# Patient Record
Sex: MALE | Race: WHITE | NOT HISPANIC OR LATINO | Employment: FULL TIME | URBAN - METROPOLITAN AREA
[De-identification: names, ages, dates, MRNs, and addresses within clinical notes are randomized per-mention and may not be internally consistent; named-entity substitution may affect disease eponyms.]

---

## 2023-04-19 LAB
ALANINE AMINOTRANSFERASE (SGPT) (U/L) IN SER/PLAS: 33 U/L (ref 10–52)
ALBUMIN (G/DL) IN SER/PLAS: 4.4 G/DL (ref 3.4–5)
ALKALINE PHOSPHATASE (U/L) IN SER/PLAS: 20 U/L (ref 33–120)
ANION GAP IN SER/PLAS: 12 MMOL/L (ref 10–20)
ASPARTATE AMINOTRANSFERASE (SGOT) (U/L) IN SER/PLAS: 19 U/L (ref 9–39)
BILIRUBIN TOTAL (MG/DL) IN SER/PLAS: 0.4 MG/DL (ref 0–1.2)
CALCIUM (MG/DL) IN SER/PLAS: 9.9 MG/DL (ref 8.6–10.3)
CARBON DIOXIDE, TOTAL (MMOL/L) IN SER/PLAS: 27 MMOL/L (ref 21–32)
CHLORIDE (MMOL/L) IN SER/PLAS: 103 MMOL/L (ref 98–107)
CHOLESTEROL (MG/DL) IN SER/PLAS: 248 MG/DL (ref 0–199)
CHOLESTEROL IN HDL (MG/DL) IN SER/PLAS: 61.1 MG/DL
CHOLESTEROL/HDL RATIO: 4.1
CREATININE (MG/DL) IN SER/PLAS: 0.9 MG/DL (ref 0.5–1.3)
ESTIMATED AVERAGE GLUCOSE FOR HBA1C: 123 MG/DL
GFR MALE: >90 ML/MIN/1.73M2
GLUCOSE (MG/DL) IN SER/PLAS: 105 MG/DL (ref 74–99)
HEMOGLOBIN A1C/HEMOGLOBIN TOTAL IN BLOOD: 5.9 %
LDL: 158 MG/DL (ref 0–99)
POTASSIUM (MMOL/L) IN SER/PLAS: 4.3 MMOL/L (ref 3.5–5.3)
PROTEIN TOTAL: 6.9 G/DL (ref 6.4–8.2)
SODIUM (MMOL/L) IN SER/PLAS: 138 MMOL/L (ref 136–145)
THYROTROPIN (MIU/L) IN SER/PLAS BY DETECTION LIMIT <= 0.05 MIU/L: 1.56 MIU/L (ref 0.44–3.98)
TRIGLYCERIDE (MG/DL) IN SER/PLAS: 147 MG/DL (ref 0–149)
UREA NITROGEN (MG/DL) IN SER/PLAS: 17 MG/DL (ref 6–23)
VLDL: 29 MG/DL (ref 0–40)

## 2023-04-24 PROBLEM — E88.810 METABOLIC SYNDROME X: Status: ACTIVE | Noted: 2023-04-24

## 2023-04-24 PROBLEM — F17.210 SMOKING GREATER THAN 25 PACK YEARS: Status: ACTIVE | Noted: 2023-04-24

## 2023-04-24 PROBLEM — E78.5 HYPERLIPIDEMIA: Status: ACTIVE | Noted: 2023-04-24

## 2023-04-24 PROBLEM — F17.200 NEEDS SMOKING CESSATION EDUCATION: Status: ACTIVE | Noted: 2023-04-24

## 2023-04-24 PROBLEM — F17.200 CURRENT SMOKER: Status: ACTIVE | Noted: 2023-04-24

## 2023-04-24 PROBLEM — E04.0 GOITER DIFFUSE, NONTOXIC: Status: ACTIVE | Noted: 2023-04-24

## 2023-04-24 PROBLEM — F10.20 ALCOHOL DEPENDENCE, EPISODIC (MULTI): Status: ACTIVE | Noted: 2023-04-24

## 2023-04-24 RX ORDER — ALBUTEROL SULFATE 90 UG/1
2 AEROSOL, METERED RESPIRATORY (INHALATION) EVERY 4 HOURS PRN
COMMUNITY
Start: 2021-11-10 | End: 2023-04-25 | Stop reason: ALTCHOICE

## 2023-04-25 ENCOUNTER — OFFICE VISIT (OUTPATIENT)
Dept: PRIMARY CARE | Facility: CLINIC | Age: 48
End: 2023-04-25
Payer: COMMERCIAL

## 2023-04-25 VITALS
WEIGHT: 224 LBS | RESPIRATION RATE: 16 BRPM | SYSTOLIC BLOOD PRESSURE: 122 MMHG | DIASTOLIC BLOOD PRESSURE: 80 MMHG | HEART RATE: 78 BPM | HEIGHT: 74 IN | BODY MASS INDEX: 28.75 KG/M2

## 2023-04-25 DIAGNOSIS — E78.2 MIXED HYPERLIPIDEMIA: ICD-10-CM

## 2023-04-25 DIAGNOSIS — F10.20 ALCOHOL DEPENDENCE, EPISODIC (MULTI): Primary | ICD-10-CM

## 2023-04-25 DIAGNOSIS — Z00.00 ENCOUNTER FOR PREVENTIVE HEALTH EXAMINATION: ICD-10-CM

## 2023-04-25 DIAGNOSIS — F17.210 SMOKING GREATER THAN 25 PACK YEARS: ICD-10-CM

## 2023-04-25 DIAGNOSIS — E66.3 OVERWEIGHT WITH BODY MASS INDEX (BMI) OF 28 TO 28.9 IN ADULT: ICD-10-CM

## 2023-04-25 DIAGNOSIS — F17.200 NEEDS SMOKING CESSATION EDUCATION: ICD-10-CM

## 2023-04-25 DIAGNOSIS — F17.200 CURRENT SMOKER: ICD-10-CM

## 2023-04-25 DIAGNOSIS — E88.810 METABOLIC SYNDROME X: ICD-10-CM

## 2023-04-25 PROCEDURE — 90471 IMMUNIZATION ADMIN: CPT | Performed by: INTERNAL MEDICINE

## 2023-04-25 PROCEDURE — 99214 OFFICE O/P EST MOD 30 MIN: CPT | Performed by: INTERNAL MEDICINE

## 2023-04-25 PROCEDURE — G0446 INTENS BEHAVE THER CARDIO DX: HCPCS | Performed by: INTERNAL MEDICINE

## 2023-04-25 PROCEDURE — 99406 BEHAV CHNG SMOKING 3-10 MIN: CPT | Performed by: INTERNAL MEDICINE

## 2023-04-25 PROCEDURE — G0442 ANNUAL ALCOHOL SCREEN 15 MIN: HCPCS | Performed by: INTERNAL MEDICINE

## 2023-04-25 PROCEDURE — 3008F BODY MASS INDEX DOCD: CPT | Performed by: INTERNAL MEDICINE

## 2023-04-25 PROCEDURE — 90750 HZV VACC RECOMBINANT IM: CPT | Performed by: INTERNAL MEDICINE

## 2023-04-25 PROCEDURE — 4004F PT TOBACCO SCREEN RCVD TLK: CPT | Performed by: INTERNAL MEDICINE

## 2023-04-25 ASSESSMENT — LIFESTYLE VARIABLES
AUDIT-C TOTAL SCORE: 10
HOW OFTEN DO YOU HAVE A DRINK CONTAINING ALCOHOL: 4 OR MORE TIMES A WEEK
HOW MANY STANDARD DRINKS CONTAINING ALCOHOL DO YOU HAVE ON A TYPICAL DAY: 5 OR 6
HAS A RELATIVE, FRIEND, DOCTOR, OR ANOTHER HEALTH PROFESSIONAL EXPRESSED CONCERN ABOUT YOUR DRINKING OR SUGGESTED YOU CUT DOWN: NO
HAVE YOU OR SOMEONE ELSE BEEN INJURED AS A RESULT OF YOUR DRINKING: NO
HOW OFTEN DURING THE LAST YEAR HAVE YOU FAILED TO DO WHAT WAS NORMALLY EXPECTED FROM YOU BECAUSE OF DRINKING: NEVER
SKIP TO QUESTIONS 9-10: 0
HOW OFTEN DURING THE LAST YEAR HAVE YOU NEEDED AN ALCOHOLIC DRINK FIRST THING IN THE MORNING TO GET YOURSELF GOING AFTER A NIGHT OF HEAVY DRINKING: NEVER
HOW OFTEN DURING THE LAST YEAR HAVE YOU HAD A FEELING OF GUILT OR REMORSE AFTER DRINKING: LESS THAN MONTHLY
HOW OFTEN DO YOU HAVE SIX OR MORE DRINKS ON ONE OCCASION: DAILY OR ALMOST DAILY
AUDIT TOTAL SCORE: -1

## 2023-04-25 ASSESSMENT — ANXIETY QUESTIONNAIRES
2. NOT BEING ABLE TO STOP OR CONTROL WORRYING: NOT AT ALL
6. BECOMING EASILY ANNOYED OR IRRITABLE: NOT AT ALL
3. WORRYING TOO MUCH ABOUT DIFFERENT THINGS: NOT AT ALL
4. TROUBLE RELAXING: NOT AT ALL
1. FEELING NERVOUS, ANXIOUS, OR ON EDGE: NOT AT ALL
IF YOU CHECKED OFF ANY PROBLEMS ON THIS QUESTIONNAIRE, HOW DIFFICULT HAVE THESE PROBLEMS MADE IT FOR YOU TO DO YOUR WORK, TAKE CARE OF THINGS AT HOME, OR GET ALONG WITH OTHER PEOPLE: NOT DIFFICULT AT ALL
GAD7 TOTAL SCORE: 0
5. BEING SO RESTLESS THAT IT IS HARD TO SIT STILL: NOT AT ALL
7. FEELING AFRAID AS IF SOMETHING AWFUL MIGHT HAPPEN: NOT AT ALL

## 2023-04-25 ASSESSMENT — COLUMBIA-SUICIDE SEVERITY RATING SCALE - C-SSRS
1. IN THE PAST MONTH, HAVE YOU WISHED YOU WERE DEAD OR WISHED YOU COULD GO TO SLEEP AND NOT WAKE UP?: NO
6. HAVE YOU EVER DONE ANYTHING, STARTED TO DO ANYTHING, OR PREPARED TO DO ANYTHING TO END YOUR LIFE?: NO
2. HAVE YOU ACTUALLY HAD ANY THOUGHTS OF KILLING YOURSELF?: NO

## 2023-04-25 ASSESSMENT — ENCOUNTER SYMPTOMS
DEPRESSION: 0
OCCASIONAL FEELINGS OF UNSTEADINESS: 0
LOSS OF SENSATION IN FEET: 0

## 2023-04-25 ASSESSMENT — PATIENT HEALTH QUESTIONNAIRE - PHQ9
1. LITTLE INTEREST OR PLEASURE IN DOING THINGS: NOT AT ALL
SUM OF ALL RESPONSES TO PHQ9 QUESTIONS 1 AND 2: 0
2. FEELING DOWN, DEPRESSED OR HOPELESS: NOT AT ALL

## 2023-04-25 NOTE — ASSESSMENT & PLAN NOTE
Working on quitting smoking ready to set a quit date less than half pack of cigarettes a day continue encouragement

## 2023-04-25 NOTE — ASSESSMENT & PLAN NOTE
Patient will have CT calcium artery scoring through CT will give us insight on whether patient has any history of lung nodules as well

## 2023-04-25 NOTE — ASSESSMENT & PLAN NOTE
Alcohol intake is 6 beers a day encouraged to cut down alcohol intake to 1-2 beers or less than 14 beers a week

## 2023-04-25 NOTE — ASSESSMENT & PLAN NOTE
Update shingles vaccination today.  Colon cancer screening completed by fecal immunoassay negative continue annual screening consider colonoscopy diet exercise measures given smoking cessation and alcohol reduction discussed reevaluate in 6 months

## 2023-04-25 NOTE — PROGRESS NOTES
"Subjective   Reason for Visit: Nato Lopez is an 48 y.o. male here for a  annual visit.     Past Medical, Surgical, and Family History reviewed and updated in chart.    Reviewed all medications by prescribing practitioner or clinical pharmacist (such as prescriptions, OTCs, herbal therapies and supplements) and documented in the medical record.    HPI    Patient Care Team:  Mario Stoll DO as PCP - General  Mario Stoll DO as PCP - [place] [facility] PCP     Review of Systems   All other systems reviewed and are negative.      Objective   Vitals:  /80 (BP Location: Left arm, Patient Position: Sitting)   Pulse 78   Resp 16   Ht 1.88 m (6' 2\")   Wt 102 kg (224 lb)   BMI 28.76 kg/m²       Physical Exam  Vitals and nursing note reviewed.   Constitutional:       General: He is not in acute distress.     Appearance: Normal appearance. He is well-developed. He is not toxic-appearing.   HENT:      Head: Normocephalic and atraumatic.      Right Ear: Tympanic membrane and external ear normal.      Left Ear: Tympanic membrane and external ear normal.      Nose: Nose normal.      Mouth/Throat:      Mouth: Mucous membranes are moist.      Pharynx: Oropharynx is clear. No oropharyngeal exudate or posterior oropharyngeal erythema.      Tonsils: No tonsillar exudate. 2+ on the right. 2+ on the left.   Eyes:      Extraocular Movements: Extraocular movements intact.      Conjunctiva/sclera: Conjunctivae normal.   Cardiovascular:      Rate and Rhythm: Normal rate and regular rhythm.      Pulses: Normal pulses.      Heart sounds: Normal heart sounds. No murmur heard.  Pulmonary:      Effort: Pulmonary effort is normal.      Breath sounds: Normal breath sounds.   Abdominal:      General: Abdomen is flat. Bowel sounds are normal.      Palpations: Abdomen is soft.   Musculoskeletal:      Cervical back: Neck supple.   Feet:      Right foot:      Skin integrity: Skin integrity normal. No ulcer, blister, skin breakdown, " erythema, warmth or callus.      Toenail Condition: Right toenails are normal.      Left foot:      Skin integrity: Skin integrity normal. No ulcer, blister, skin breakdown, erythema, warmth or callus.      Toenail Condition: Left toenails are normal.   Lymphadenopathy:      Cervical: No cervical adenopathy.   Skin:     General: Skin is warm and dry.      Capillary Refill: Capillary refill takes more than 3 seconds.      Findings: No rash.   Neurological:      Mental Status: He is alert. Mental status is at baseline.      Sensory: Sensation is intact.   Psychiatric:         Mood and Affect: Mood normal.         Behavior: Behavior normal.         Thought Content: Thought content normal.         Judgment: Judgment normal.         Assessment/Plan   Problem List Items Addressed This Visit          Endocrine/Metabolic    Overweight with body mass index (BMI) of 28 to 28.9 in adult    Overview     Patient committed to reducing caloric intake through diet and exercise hikes and likes to fish continue to promote active lifestyle            Other    Alcohol dependence, episodic (CMS/HCC) - Primary    Current Assessment & Plan     Alcohol intake is 6 beers a day encouraged to cut down alcohol intake to 1-2 beers or less than 14 beers a week         Hyperlipidemia    Current Assessment & Plan     Promotion of low-cholesterol diet Mediterranean diet with focus on reducing processed foods increasing more fish and less beef in diet particularly in the setting of mild impaired fasting sugars with mildly elevated fasting blood sugar and hemoglobin A1c of 5.9         Metabolic syndrome X    Current Assessment & Plan     Evaluate further risk with CT calcium score         Needs smoking cessation education    Current Assessment & Plan     Education given on smoking cessation         Current smoker    Current Assessment & Plan     Working on quitting smoking ready to set a quit date less than half pack of cigarettes a day continue  encouragement         Smoking greater than 25 pack years    Current Assessment & Plan     Patient will have CT calcium artery scoring through CT will give us insight on whether patient has any history of lung nodules as well         Encounter for preventive health examination    Current Assessment & Plan     Update shingles vaccination today.  Colon cancer screening completed by fecal immunoassay negative continue annual screening consider colonoscopy diet exercise measures given smoking cessation and alcohol reduction discussed reevaluate in 6 months

## 2023-04-25 NOTE — ASSESSMENT & PLAN NOTE
Promotion of low-cholesterol diet Mediterranean diet with focus on reducing processed foods increasing more fish and less beef in diet particularly in the setting of mild impaired fasting sugars with mildly elevated fasting blood sugar and hemoglobin A1c of 5.9

## 2023-04-25 NOTE — PROGRESS NOTES
"Subjective   Patient ID: Nato Lopez is a 48 y.o. male who presents for Follow-up and Hypothyroidism.    HPI     Review of Systems    Objective   /80 (BP Location: Left arm, Patient Position: Sitting)   Pulse 78   Resp 16   Ht 1.88 m (6' 2\")   Wt 102 kg (224 lb)   BMI 28.76 kg/m²     Physical Exam    Assessment/Plan          "

## 2023-10-18 PROCEDURE — 80053 COMPREHEN METABOLIC PANEL: CPT

## 2023-10-18 PROCEDURE — 82043 UR ALBUMIN QUANTITATIVE: CPT

## 2023-10-18 PROCEDURE — 80061 LIPID PANEL: CPT

## 2023-10-18 PROCEDURE — 86803 HEPATITIS C AB TEST: CPT

## 2023-10-18 PROCEDURE — 83036 HEMOGLOBIN GLYCOSYLATED A1C: CPT

## 2023-10-18 PROCEDURE — 82570 ASSAY OF URINE CREATININE: CPT

## 2023-10-20 DIAGNOSIS — Z23 FLU VACCINE NEED: ICD-10-CM

## 2023-10-25 ENCOUNTER — OFFICE VISIT (OUTPATIENT)
Dept: PRIMARY CARE | Facility: CLINIC | Age: 48
End: 2023-10-25
Payer: COMMERCIAL

## 2023-10-25 VITALS
DIASTOLIC BLOOD PRESSURE: 80 MMHG | HEIGHT: 74 IN | HEART RATE: 68 BPM | WEIGHT: 221 LBS | BODY MASS INDEX: 28.36 KG/M2 | SYSTOLIC BLOOD PRESSURE: 122 MMHG

## 2023-10-25 DIAGNOSIS — E78.2 MIXED HYPERLIPIDEMIA: ICD-10-CM

## 2023-10-25 DIAGNOSIS — F10.20 ALCOHOL DEPENDENCE, EPISODIC (MULTI): ICD-10-CM

## 2023-10-25 DIAGNOSIS — F17.200 CURRENT SMOKER: ICD-10-CM

## 2023-10-25 DIAGNOSIS — E88.810 METABOLIC SYNDROME X: ICD-10-CM

## 2023-10-25 DIAGNOSIS — F17.210 SMOKING GREATER THAN 25 PACK YEARS: ICD-10-CM

## 2023-10-25 DIAGNOSIS — Z23 NEED FOR SHINGLES VACCINE: ICD-10-CM

## 2023-10-25 DIAGNOSIS — Z12.11 COLON CANCER SCREENING: Primary | ICD-10-CM

## 2023-10-25 DIAGNOSIS — F17.200 NEEDS SMOKING CESSATION EDUCATION: ICD-10-CM

## 2023-10-25 DIAGNOSIS — E66.3 OVERWEIGHT WITH BODY MASS INDEX (BMI) OF 28 TO 28.9 IN ADULT: ICD-10-CM

## 2023-10-25 DIAGNOSIS — Z00.00 ENCOUNTER FOR PREVENTIVE HEALTH EXAMINATION: ICD-10-CM

## 2023-10-25 PROCEDURE — 90471 IMMUNIZATION ADMIN: CPT | Performed by: INTERNAL MEDICINE

## 2023-10-25 PROCEDURE — 90750 HZV VACC RECOMBINANT IM: CPT | Performed by: INTERNAL MEDICINE

## 2023-10-25 PROCEDURE — 4004F PT TOBACCO SCREEN RCVD TLK: CPT | Performed by: INTERNAL MEDICINE

## 2023-10-25 PROCEDURE — 3008F BODY MASS INDEX DOCD: CPT | Performed by: INTERNAL MEDICINE

## 2023-10-25 PROCEDURE — 99214 OFFICE O/P EST MOD 30 MIN: CPT | Performed by: INTERNAL MEDICINE

## 2023-10-25 RX ORDER — CLOBETASOL PROPIONATE 0.5 MG/G
OINTMENT TOPICAL 2 TIMES DAILY
COMMUNITY
Start: 2023-09-11

## 2023-10-25 RX ORDER — ROSUVASTATIN CALCIUM 10 MG/1
10 TABLET, COATED ORAL DAILY
Qty: 90 TABLET | Refills: 3 | Status: SHIPPED | OUTPATIENT
Start: 2023-10-25 | End: 2024-10-24

## 2023-10-25 RX ORDER — MUPIROCIN 20 MG/G
OINTMENT TOPICAL
COMMUNITY
Start: 2023-09-11

## 2023-10-25 NOTE — PROGRESS NOTES
"Subjective   Reason for Visit: Nato Lopez is an 48 y.o. male here for a Medicare Wellness visit.          Reviewed all medications by prescribing practitioner or clinical pharmacist (such as prescriptions, OTCs, herbal therapies and supplements) and documented in the medical record.    HPI    Patient Care Team:  Mario Stoll DO as PCP - General  Mario Stoll DO as PCP - [facility]MA PCP     Review of Systems    Objective   Vitals:  /80 (BP Location: Left arm, Patient Position: Sitting)   Pulse 68   Ht 1.88 m (6' 2\")   Wt 100 kg (221 lb)   BMI 28.37 kg/m²       Physical Exam    Assessment/Plan   Problem List Items Addressed This Visit       Alcohol dependence, episodic (CMS/[facility])    Hyperlipidemia    Metabolic syndrome X    Needs smoking cessation education    Current smoker    Smoking greater than 25 pack years    Overweight with body mass index (BMI) of 28 to 28.9 in adult    Overview     Patient committed to reducing caloric intake through diet and exercise hikes and likes to fish continue to promote active lifestyle         Encounter for preventive health examination     Other Visit Diagnoses       Need for shingles vaccine        Relevant Orders    Zoster vaccine, recombinant, adult (SHINGRIX) (Completed)               "

## 2023-10-25 NOTE — PROGRESS NOTES
"Subjective   Patient ID: Nato Lopez is a 48 y.o. male who presents for Follow-up.    HPI     Review of Systems    Objective   /80 (BP Location: Left arm, Patient Position: Sitting)   Pulse 68   Ht 1.88 m (6' 2\")   Wt 100 kg (221 lb)   BMI 28.37 kg/m²     Physical Exam    Assessment/Plan          "

## 2024-02-01 ENCOUNTER — HOSPITAL ENCOUNTER (OUTPATIENT)
Dept: GASTROENTEROLOGY | Facility: HOSPITAL | Age: 49
Discharge: HOME | End: 2024-02-01
Payer: COMMERCIAL

## 2024-02-01 ENCOUNTER — ANESTHESIA (OUTPATIENT)
Dept: GASTROENTEROLOGY | Facility: HOSPITAL | Age: 49
End: 2024-02-01
Payer: COMMERCIAL

## 2024-02-01 ENCOUNTER — ANESTHESIA EVENT (OUTPATIENT)
Dept: GASTROENTEROLOGY | Facility: HOSPITAL | Age: 49
End: 2024-02-01
Payer: COMMERCIAL

## 2024-02-01 VITALS
HEART RATE: 76 BPM | OXYGEN SATURATION: 99 % | RESPIRATION RATE: 14 BRPM | BODY MASS INDEX: 28.36 KG/M2 | DIASTOLIC BLOOD PRESSURE: 92 MMHG | TEMPERATURE: 98 F | SYSTOLIC BLOOD PRESSURE: 167 MMHG | HEIGHT: 74 IN | WEIGHT: 221 LBS

## 2024-02-01 DIAGNOSIS — Z12.11 SCREEN FOR COLON CANCER: Primary | ICD-10-CM

## 2024-02-01 PROCEDURE — 2500000004 HC RX 250 GENERAL PHARMACY W/ HCPCS (ALT 636 FOR OP/ED): Performed by: NURSE ANESTHETIST, CERTIFIED REGISTERED

## 2024-02-01 PROCEDURE — 2500000005 HC RX 250 GENERAL PHARMACY W/O HCPCS: Performed by: NURSE ANESTHETIST, CERTIFIED REGISTERED

## 2024-02-01 PROCEDURE — 7100000009 HC PHASE TWO TIME - INITIAL BASE CHARGE

## 2024-02-01 PROCEDURE — 3700000001 HC GENERAL ANESTHESIA TIME - INITIAL BASE CHARGE

## 2024-02-01 PROCEDURE — 0753T DGTZ GLS MCRSCP SLD LEVEL IV: CPT | Mod: TC,SUR,PORLAB | Performed by: SURGERY

## 2024-02-01 PROCEDURE — 88305 TISSUE EXAM BY PATHOLOGIST: CPT | Performed by: PATHOLOGY

## 2024-02-01 PROCEDURE — 45380 COLONOSCOPY AND BIOPSY: CPT | Performed by: SURGERY

## 2024-02-01 PROCEDURE — 3700000002 HC GENERAL ANESTHESIA TIME - EACH INCREMENTAL 1 MINUTE

## 2024-02-01 PROCEDURE — 7100000010 HC PHASE TWO TIME - EACH INCREMENTAL 1 MINUTE

## 2024-02-01 RX ORDER — LIDOCAINE HYDROCHLORIDE 20 MG/ML
INJECTION, SOLUTION INFILTRATION; PERINEURAL AS NEEDED
Status: DISCONTINUED | OUTPATIENT
Start: 2024-02-01 | End: 2024-02-01

## 2024-02-01 RX ORDER — SODIUM CHLORIDE 9 MG/ML
20 INJECTION, SOLUTION INTRAVENOUS CONTINUOUS
Status: CANCELLED | OUTPATIENT
Start: 2024-02-01

## 2024-02-01 RX ORDER — SODIUM CHLORIDE 9 MG/ML
INJECTION, SOLUTION INTRAVENOUS CONTINUOUS PRN
Status: DISCONTINUED | OUTPATIENT
Start: 2024-02-01 | End: 2024-02-01

## 2024-02-01 RX ORDER — PROPOFOL 10 MG/ML
INJECTION, EMULSION INTRAVENOUS AS NEEDED
Status: DISCONTINUED | OUTPATIENT
Start: 2024-02-01 | End: 2024-02-01

## 2024-02-01 RX ADMIN — PROPOFOL 30 MG: 10 INJECTION, EMULSION INTRAVENOUS at 08:30

## 2024-02-01 RX ADMIN — LIDOCAINE HYDROCHLORIDE 50 MG: 20 INJECTION, SOLUTION INFILTRATION; PERINEURAL at 08:24

## 2024-02-01 RX ADMIN — PROPOFOL 30 MG: 10 INJECTION, EMULSION INTRAVENOUS at 08:42

## 2024-02-01 RX ADMIN — PROPOFOL 20 MG: 10 INJECTION, EMULSION INTRAVENOUS at 08:36

## 2024-02-01 RX ADMIN — PROPOFOL 20 MG: 10 INJECTION, EMULSION INTRAVENOUS at 08:34

## 2024-02-01 RX ADMIN — PROPOFOL 30 MG: 10 INJECTION, EMULSION INTRAVENOUS at 08:27

## 2024-02-01 RX ADMIN — PROPOFOL 30 MG: 10 INJECTION, EMULSION INTRAVENOUS at 08:38

## 2024-02-01 RX ADMIN — PROPOFOL 100 MG: 10 INJECTION, EMULSION INTRAVENOUS at 08:24

## 2024-02-01 RX ADMIN — PROPOFOL 20 MG: 10 INJECTION, EMULSION INTRAVENOUS at 08:43

## 2024-02-01 RX ADMIN — PROPOFOL 30 MG: 10 INJECTION, EMULSION INTRAVENOUS at 08:35

## 2024-02-01 RX ADMIN — PROPOFOL 20 MG: 10 INJECTION, EMULSION INTRAVENOUS at 08:40

## 2024-02-01 RX ADMIN — SODIUM CHLORIDE: 9 INJECTION, SOLUTION INTRAVENOUS at 08:20

## 2024-02-01 RX ADMIN — PROPOFOL 20 MG: 10 INJECTION, EMULSION INTRAVENOUS at 08:28

## 2024-02-01 RX ADMIN — PROPOFOL 50 MG: 10 INJECTION, EMULSION INTRAVENOUS at 08:25

## 2024-02-01 RX ADMIN — PROPOFOL 20 MG: 10 INJECTION, EMULSION INTRAVENOUS at 08:32

## 2024-02-01 SDOH — HEALTH STABILITY: MENTAL HEALTH: CURRENT SMOKER: 1

## 2024-02-01 ASSESSMENT — PAIN - FUNCTIONAL ASSESSMENT
PAIN_FUNCTIONAL_ASSESSMENT: 0-10

## 2024-02-01 ASSESSMENT — PAIN SCALES - GENERAL
PAINLEVEL_OUTOF10: 0 - NO PAIN
PAIN_LEVEL: 1
PAINLEVEL_OUTOF10: 0 - NO PAIN

## 2024-02-01 ASSESSMENT — COLUMBIA-SUICIDE SEVERITY RATING SCALE - C-SSRS
6. HAVE YOU EVER DONE ANYTHING, STARTED TO DO ANYTHING, OR PREPARED TO DO ANYTHING TO END YOUR LIFE?: NO
1. IN THE PAST MONTH, HAVE YOU WISHED YOU WERE DEAD OR WISHED YOU COULD GO TO SLEEP AND NOT WAKE UP?: NO
2. HAVE YOU ACTUALLY HAD ANY THOUGHTS OF KILLING YOURSELF?: NO

## 2024-02-01 NOTE — ANESTHESIA POSTPROCEDURE EVALUATION
Patient: Nato Lopez    Procedure Summary       Date: 02/01/24 Room / Location: [facility]    Anesthesia Start: 0820 Anesthesia Stop: 0850    Procedure: COLONOSCOPY Diagnosis: Screen for colon cancer    Scheduled Providers: Hugo Muñoz MD Responsible Provider: BOWEN Ventura    Anesthesia Type: MAC ASA Status: 2            Anesthesia Type: MAC    Vitals Value Taken Time   /93 02/01/24 0848   Temp 36.7 °C (98 °F) 02/01/24 0848   Pulse 71 02/01/24 0848   Resp 12 02/01/24 0848   SpO2 95 % 02/01/24 0848       Anesthesia Post Evaluation    Patient location during evaluation: PACU  Patient participation: complete - patient participated  Level of consciousness: sleepy but conscious  Pain score: 1  Pain management: adequate  Airway patency: patent  Cardiovascular status: acceptable and blood pressure returned to baseline  Respiratory status: acceptable, room air and spontaneous ventilation  Hydration status: acceptable  Postoperative Nausea and Vomiting: none        There were no known notable events for this encounter.

## 2024-02-01 NOTE — ANESTHESIA PREPROCEDURE EVALUATION
Patient: Nato Lopez    Procedure Information       Date/Time: 02/01/24 0800    Scheduled providers: Hugo Muñoz MD    Procedure: COLONOSCOPY    Location: [facility]            Relevant Problems   Anesthesia (within normal limits)      Cardiovascular   (+) Hyperlipidemia      Endocrine   (+) Goiter diffuse, nontoxic       Clinical information reviewed:   Tobacco  Allergies  Meds   Med Hx  Surg Hx   Fam Hx  Soc Hx        NPO Detail:  NPO/Void Status  Date of Last Liquid: 02/01/24  Time of Last Liquid: 0630  Date of Last Solid: 01/30/24  Time of Last Solid: 2000  Last Intake Type: Clear fluids         Physical Exam    Airway  Mallampati: III  TM distance: >3 FB  Neck ROM: full     Cardiovascular - normal exam     Dental - normal exam     Pulmonary - normal exam     Abdominal            Anesthesia Plan    History of general anesthesia?: yes  History of complications of general anesthesia?: no    ASA 2     MAC     The patient is a current smoker.  Patient was previously instructed to abstain from smoking on day of procedure.  Patient did not smoke on day of procedure.    intravenous induction   Anesthetic plan and risks discussed with patient.  Use of blood products discussed with patient who consented to blood products.    Plan discussed with CRNA.

## 2024-02-01 NOTE — H&P
History Of Present Illness  Nato Lopez is a 48 y.o. male presenting with hx polyps.     Past Medical History  He has a past medical history of Dyslipidemia, Elevated blood-pressure reading, without diagnosis of hypertension (08/11/2020), Goiter diffuse, nontoxic, Metabolic syndrome (04/15/2021), Other specified personal risk factors, not elsewhere classified (04/15/2021), Personal history of (healed) traumatic fracture, Personal history of nicotine dependence (08/10/2020), Personal history of other diseases of the respiratory system (11/10/2021), Personal history of other diseases of the respiratory system (11/10/2021), Personal history of other endocrine, nutritional and metabolic disease (08/10/2020), Personal history of other endocrine, nutritional and metabolic disease, and Personal history of other specified conditions (04/15/2021).    Surgical History  He has a past surgical history that includes Other surgical history (08/11/2020).     Social History  He reports that he has been smoking cigarettes. He has a 12.50 pack-year smoking history. He has never used smokeless tobacco. He reports current alcohol use of about 24.0 standard drinks of alcohol per week. He reports that he does not use drugs.    Family History  Family History   Problem Relation Name Age of Onset    Diabetes Mother      Alzheimer's disease Mother      Hypertension Father      Thyroid disease Father      Other (vascular disorder) Father      Thyroid disease Sister      Breast cancer Sister      Hypertension Brother          Allergies  Patient has no known allergies.    Review of Systems   All other systems reviewed and are negative.       Physical Exam  Vitals reviewed.   Cardiovascular:      Rate and Rhythm: Normal rate and regular rhythm.   Pulmonary:      Breath sounds: Normal breath sounds.   Skin:     General: Skin is warm and dry.   Neurological:      Mental Status: He is alert.   Psychiatric:         Mood and Affect: Mood normal.  "         Last Recorded Vitals  Blood pressure (!) 145/100, pulse 78, temperature 36.2 °C (97.2 °F), temperature source Temporal, resp. rate 18, height 1.88 m (6' 2\"), weight 100 kg (221 lb), SpO2 95 %.    Relevant Results               Assessment/Plan   Active Problems:  There are no active Hospital Problems.      For colo       I spent 15 minutes in the professional and overall care of this patient.      Hugo Muñoz MD  "

## 2024-02-02 NOTE — ADDENDUM NOTE
Encounter addended by: Mauri Chamorro RN on: 2/2/2024 10:22 AM   Actions taken: Contacts section saved, Flowsheet accepted

## 2024-02-06 LAB
LABORATORY COMMENT REPORT: NORMAL
PATH REPORT.FINAL DX SPEC: NORMAL
PATH REPORT.GROSS SPEC: NORMAL
PATH REPORT.RELEVANT HX SPEC: NORMAL
PATH REPORT.TOTAL CANCER: NORMAL

## 2024-04-22 ENCOUNTER — LAB (OUTPATIENT)
Dept: LAB | Facility: LAB | Age: 49
End: 2024-04-22
Payer: COMMERCIAL

## 2024-04-22 DIAGNOSIS — F10.20 ALCOHOL DEPENDENCE, EPISODIC (MULTI): ICD-10-CM

## 2024-04-22 DIAGNOSIS — E88.810 METABOLIC SYNDROME X: ICD-10-CM

## 2024-04-22 DIAGNOSIS — F17.210 SMOKING GREATER THAN 25 PACK YEARS: ICD-10-CM

## 2024-04-22 DIAGNOSIS — E78.2 MIXED HYPERLIPIDEMIA: ICD-10-CM

## 2024-04-22 DIAGNOSIS — F17.200 CURRENT SMOKER: ICD-10-CM

## 2024-04-22 DIAGNOSIS — Z12.11 COLON CANCER SCREENING: ICD-10-CM

## 2024-04-22 DIAGNOSIS — F17.200 NEEDS SMOKING CESSATION EDUCATION: ICD-10-CM

## 2024-04-22 DIAGNOSIS — E66.3 OVERWEIGHT WITH BODY MASS INDEX (BMI) OF 28 TO 28.9 IN ADULT: ICD-10-CM

## 2024-04-22 LAB
ALBUMIN SERPL BCP-MCNC: 4.8 G/DL (ref 3.4–5)
ALP SERPL-CCNC: 22 U/L (ref 33–120)
ALT SERPL W P-5'-P-CCNC: 26 U/L (ref 10–52)
ANION GAP SERPL CALC-SCNC: 13 MMOL/L (ref 10–20)
AST SERPL W P-5'-P-CCNC: 19 U/L (ref 9–39)
BILIRUB SERPL-MCNC: 0.3 MG/DL (ref 0–1.2)
BUN SERPL-MCNC: 14 MG/DL (ref 6–23)
CALCIUM SERPL-MCNC: 9.9 MG/DL (ref 8.6–10.3)
CHLORIDE SERPL-SCNC: 103 MMOL/L (ref 98–107)
CHOLEST SERPL-MCNC: 212 MG/DL (ref 0–199)
CHOLESTEROL/HDL RATIO: 2.8
CO2 SERPL-SCNC: 28 MMOL/L (ref 21–32)
CREAT SERPL-MCNC: 0.9 MG/DL (ref 0.5–1.3)
CREAT UR-MCNC: 130.1 MG/DL (ref 20–370)
EGFRCR SERPLBLD CKD-EPI 2021: >90 ML/MIN/1.73M*2
EST. AVERAGE GLUCOSE BLD GHB EST-MCNC: 134 MG/DL
GLUCOSE SERPL-MCNC: 97 MG/DL (ref 74–99)
HBA1C MFR BLD: 6.3 %
HDLC SERPL-MCNC: 75.4 MG/DL
LDLC SERPL CALC-MCNC: 87 MG/DL
MICROALBUMIN UR-MCNC: 9.1 MG/L
MICROALBUMIN/CREAT UR: 7 UG/MG CREAT
NON HDL CHOLESTEROL: 137 MG/DL (ref 0–149)
POTASSIUM SERPL-SCNC: 5.1 MMOL/L (ref 3.5–5.3)
PROT SERPL-MCNC: 7.2 G/DL (ref 6.4–8.2)
SODIUM SERPL-SCNC: 139 MMOL/L (ref 136–145)
TRIGL SERPL-MCNC: 249 MG/DL (ref 0–149)
VLDL: 50 MG/DL (ref 0–40)

## 2024-04-22 PROCEDURE — 82043 UR ALBUMIN QUANTITATIVE: CPT

## 2024-04-22 PROCEDURE — 80061 LIPID PANEL: CPT

## 2024-04-22 PROCEDURE — 36415 COLL VENOUS BLD VENIPUNCTURE: CPT

## 2024-04-22 PROCEDURE — 80053 COMPREHEN METABOLIC PANEL: CPT

## 2024-04-22 PROCEDURE — 82570 ASSAY OF URINE CREATININE: CPT

## 2024-04-22 PROCEDURE — 83036 HEMOGLOBIN GLYCOSYLATED A1C: CPT

## 2024-04-26 ENCOUNTER — OFFICE VISIT (OUTPATIENT)
Dept: PRIMARY CARE | Facility: CLINIC | Age: 49
End: 2024-04-26
Payer: COMMERCIAL

## 2024-04-26 VITALS
SYSTOLIC BLOOD PRESSURE: 134 MMHG | HEIGHT: 74 IN | WEIGHT: 213 LBS | BODY MASS INDEX: 27.34 KG/M2 | HEART RATE: 82 BPM | DIASTOLIC BLOOD PRESSURE: 80 MMHG

## 2024-04-26 DIAGNOSIS — E88.810 METABOLIC SYNDROME X: ICD-10-CM

## 2024-04-26 DIAGNOSIS — Z00.00 ENCOUNTER FOR PREVENTIVE HEALTH EXAMINATION: Primary | ICD-10-CM

## 2024-04-26 DIAGNOSIS — F17.200 NEEDS SMOKING CESSATION EDUCATION: ICD-10-CM

## 2024-04-26 DIAGNOSIS — F17.200 CURRENT SMOKER: ICD-10-CM

## 2024-04-26 DIAGNOSIS — F17.210 SMOKING GREATER THAN 25 PACK YEARS: ICD-10-CM

## 2024-04-26 DIAGNOSIS — K63.5 DYSPLASTIC POLYP OF COLON: ICD-10-CM

## 2024-04-26 DIAGNOSIS — Z12.5 PROSTATE CANCER SCREENING: ICD-10-CM

## 2024-04-26 DIAGNOSIS — F10.20 ALCOHOL DEPENDENCE, EPISODIC (MULTI): ICD-10-CM

## 2024-04-26 DIAGNOSIS — R73.02 IMPAIRED GLUCOSE TOLERANCE: ICD-10-CM

## 2024-04-26 DIAGNOSIS — E66.3 OVERWEIGHT WITH BODY MASS INDEX (BMI) OF 28 TO 28.9 IN ADULT: ICD-10-CM

## 2024-04-26 DIAGNOSIS — Z12.11 COLON CANCER SCREENING: ICD-10-CM

## 2024-04-26 DIAGNOSIS — E78.2 MIXED HYPERLIPIDEMIA: ICD-10-CM

## 2024-04-26 PROBLEM — I10 PRIMARY HYPERTENSION: Status: ACTIVE | Noted: 2024-04-26

## 2024-04-26 PROBLEM — L65.9 NONSCARRING HAIR LOSS: Status: ACTIVE | Noted: 2024-04-26

## 2024-04-26 PROCEDURE — 3079F DIAST BP 80-89 MM HG: CPT | Performed by: INTERNAL MEDICINE

## 2024-04-26 PROCEDURE — 99396 PREV VISIT EST AGE 40-64: CPT | Performed by: INTERNAL MEDICINE

## 2024-04-26 PROCEDURE — G0296 VISIT TO DETERM LDCT ELIG: HCPCS | Performed by: INTERNAL MEDICINE

## 2024-04-26 PROCEDURE — 3075F SYST BP GE 130 - 139MM HG: CPT | Performed by: INTERNAL MEDICINE

## 2024-04-26 PROCEDURE — 3008F BODY MASS INDEX DOCD: CPT | Performed by: INTERNAL MEDICINE

## 2024-04-26 PROCEDURE — G0442 ANNUAL ALCOHOL SCREEN 15 MIN: HCPCS | Performed by: INTERNAL MEDICINE

## 2024-04-26 PROCEDURE — 99406 BEHAV CHNG SMOKING 3-10 MIN: CPT | Performed by: INTERNAL MEDICINE

## 2024-04-26 RX ORDER — HYDROCHLOROTHIAZIDE 12.5 MG/1
12.5 TABLET ORAL DAILY
Qty: 90 TABLET | Refills: 3 | Status: SHIPPED | OUTPATIENT
Start: 2024-04-26 | End: 2025-04-26

## 2024-04-26 ASSESSMENT — ENCOUNTER SYMPTOMS
DEPRESSION: 0
LOSS OF SENSATION IN FEET: 0
OCCASIONAL FEELINGS OF UNSTEADINESS: 0

## 2024-04-26 ASSESSMENT — PATIENT HEALTH QUESTIONNAIRE - PHQ9
1. LITTLE INTEREST OR PLEASURE IN DOING THINGS: NOT AT ALL
2. FEELING DOWN, DEPRESSED OR HOPELESS: NOT AT ALL
SUM OF ALL RESPONSES TO PHQ9 QUESTIONS 1 AND 2: 0

## 2024-04-26 NOTE — PROGRESS NOTES
"I spent more than 3 minutes but less than 10 minutes counseling patient about need for smoking/tobacco cessation and how I can get support efforts when patient is ready to quit.  Discussed nicotine replacement therapy such as bupropion nicotine replacement therapy and Chantix .  Hypnosis,support groups,and acupuncture are other potential options.Alcohol consumption becomes hazardous when consuming women oh over 65 years old greater than 7 drinks per week or greater than 3 drinks per occasion for men greater than 14 drinks per week or greater than 4 drinks per occasion.  I spent 15 minutes screening for alcohol use.Subjective   Reason for Visit: Nato Lopez is an 49 y.o. male here for a  annual preventative health visit.     Past Medical, Surgical, and Family History reviewed and updated in chart.    Reviewed all medications by prescribing practitioner or clinical pharmacist (such as prescriptions, OTCs, herbal therapies and supplements) and documented in the medical record.    HPI    Patient Care Team:  Mario Stoll DO as PCP - General  Mario Stoll DO as PCP - Markie PALMA PCP     Review of Systems   All other systems reviewed and are negative.      Objective   Vitals:  /80 (BP Location: Right arm, Patient Position: Sitting)   Pulse 82   Ht 1.88 m (6' 2\")   Wt 96.6 kg (213 lb)   BMI 27.35 kg/m²       Physical Exam  Vitals and nursing note reviewed.   Constitutional:       General: He is not in acute distress.     Appearance: Normal appearance. He is well-developed. He is not toxic-appearing.   HENT:      Head: Normocephalic and atraumatic.      Right Ear: Tympanic membrane and external ear normal.      Left Ear: Tympanic membrane and external ear normal.      Nose: Nose normal.      Mouth/Throat:      Mouth: Mucous membranes are moist.      Pharynx: Oropharynx is clear. No oropharyngeal exudate or posterior oropharyngeal erythema.      Tonsils: No tonsillar exudate. 2+ on the right. 2+ " on the left.   Eyes:      Extraocular Movements: Extraocular movements intact.      Conjunctiva/sclera: Conjunctivae normal.   Cardiovascular:      Rate and Rhythm: Normal rate and regular rhythm.      Pulses: Normal pulses.      Heart sounds: Normal heart sounds. No murmur heard.  Pulmonary:      Effort: Pulmonary effort is normal.      Breath sounds: Normal breath sounds.   Abdominal:      General: Abdomen is flat. Bowel sounds are normal.      Palpations: Abdomen is soft.   Musculoskeletal:      Cervical back: Neck supple.   Feet:      Right foot:      Skin integrity: Skin integrity normal. No ulcer, blister, skin breakdown, erythema, warmth or callus.      Toenail Condition: Right toenails are normal.      Left foot:      Skin integrity: Skin integrity normal. No ulcer, blister, skin breakdown, erythema, warmth or callus.      Toenail Condition: Left toenails are normal.   Lymphadenopathy:      Cervical: No cervical adenopathy.   Skin:     General: Skin is warm and dry.      Capillary Refill: Capillary refill takes more than 3 seconds.      Findings: No rash.   Neurological:      Mental Status: He is alert. Mental status is at baseline.      Sensory: Sensation is intact.   Psychiatric:         Mood and Affect: Mood normal.         Behavior: Behavior normal.         Thought Content: Thought content normal.         Judgment: Judgment normal.         Assessment/Plan   Problem List Items Addressed This Visit       Alcohol dependence, episodic (Multi)    Current Assessment & Plan     Counseled patient on importance on reducing regular alcohol intake drinking about a sixpack of beer a day in the setting of impaired fasting sugars no evidence of liver dysfunction or fatty liver disease at this time continue to advise reduction and cessation and lieu of risk factors related to developing diabetes         Relevant Medications    hydroCHLOROthiazide (Microzide) 12.5 mg tablet    Other Relevant Orders    CT lung screening  low dose    Comprehensive Metabolic Panel    Hemoglobin A1C    Lipid Panel    Albumin , Urine Random    Follow Up In [facility] - PCP - Established    Referral to Tobacco Cessation Counseling    Hyperlipidemia    Current Assessment & Plan     Significant improvement in LDL cholesterol from LDL of 187 to LDL cholesterol of 87 with rosuvastatin 10 mg daily continue calcium CT score 93 mild multivessel continue aggressive management in the setting of IGT         Relevant Medications    hydroCHLOROthiazide (Microzide) 12.5 mg tablet    Other Relevant Orders    CT lung screening low dose    Comprehensive Metabolic Panel    Hemoglobin A1C    Lipid Panel    Albumin , Urine Random    Follow Up [facility] - PCP - Established    Referral to Tobacco Cessation Counseling    Metabolic syndrome X    Relevant Medications    hydroCHLOROthiazide (Microzide) 12.5 mg tablet    Other Relevant Orders    CT lung screening low dose    Comprehensive Metabolic Panel    Hemoglobin A1C    Lipid Panel    Albumin , Urine Random    Follow Up In [facility] - PCP - Established    Referral to Tobacco Cessation Counseling    Needs smoking cessation education    Relevant Medications    hydroCHLOROthiazide (Microzide) 12.5 mg tablet    Other Relevant Orders    CT lung screening low dose    Comprehensive Metabolic Panel    Hemoglobin A1C    Lipid Panel    Albumin , Urine Random    Follow Up [facility] - PCP - Established    Referral to Tobacco Cessation Counseling    Current smoker    Current Assessment & Plan     Patient trying to quit smoking smoking about half pack of cigarettes a day at this time consult smoking cessation education using different nicotine replacement therapies to reduce smoking at work asymptomatic.  Referral made for lung cancer screening         Relevant Medications    hydroCHLOROthiazide (Microzide) 12.5 mg tablet    Other Relevant Orders    CT lung screening low dose     Comprehensive Metabolic Panel    Hemoglobin A1C    Lipid Panel    Albumin , Urine Random    Follow Up In [facility] - PCP - Established    Referral to Tobacco Cessation Counseling    Smoking greater than 25 pack years    Current Assessment & Plan     Will assess lung cancer screening at this time         Overweight with body mass index (BMI) of 28 to 28.9 in adult    Overview     Patient committed to reducing caloric intake through diet and exercise hikes and likes to fish continue to promote active lifestyle         Encounter for preventive health examination - Primary    Current Assessment & Plan     Prostate cancer screening will be reassessed next evaluation up-to-date with vaccinations and screenings screening for depression okay         Impaired glucose tolerance    Current Assessment & Plan     Reevaluate with changes in diet and exercise continued improvement in weight loss hemoglobin A1c 6.3 fasting sugar of 97 reevaluate next visit         Relevant Medications    hydroCHLOROthiazide (Microzide) 12.5 mg tablet    Other Relevant Orders    CT lung screening low dose    Comprehensive Metabolic Panel    Hemoglobin A1C    Lipid Panel    Albumin , Urine Random    Follow Up In [facility] - PCP - Established    Referral to Tobacco Cessation Counseling    Dysplastic polyp of colon    Current Assessment & Plan     Patient is scheduled for 1 year evaluation regarding serrated adenoma with dysplasia removed last year          Other Visit Diagnoses       Colon cancer screening        Prostate cancer screening        Relevant Orders    PSA

## 2024-04-26 NOTE — ASSESSMENT & PLAN NOTE
Patient trying to quit smoking smoking about half pack of cigarettes a day at this time consult smoking cessation education using different nicotine replacement therapies to reduce smoking at work asymptomatic.  Referral made for lung cancer screening

## 2024-04-26 NOTE — ASSESSMENT & PLAN NOTE
Counseled patient on importance on reducing regular alcohol intake drinking about a sixpack of beer a day in the setting of impaired fasting sugars no evidence of liver dysfunction or fatty liver disease at this time continue to advise reduction and cessation and lieu of risk factors related to developing diabetes

## 2024-04-26 NOTE — ASSESSMENT & PLAN NOTE
Prostate cancer screening will be reassessed next evaluation up-to-date with vaccinations and screenings screening for depression okay

## 2024-04-26 NOTE — ASSESSMENT & PLAN NOTE
Patient is scheduled for 1 year evaluation regarding serrated adenoma with dysplasia removed last year

## 2024-04-26 NOTE — ASSESSMENT & PLAN NOTE
Significant improvement in LDL cholesterol from LDL of 187 to LDL cholesterol of 87 with rosuvastatin 10 mg daily continue calcium CT score 93 mild multivessel continue aggressive management in the setting of IGT

## 2024-04-26 NOTE — ASSESSMENT & PLAN NOTE
In addition to smoking and alcohol cessation encouraged DASH diet and start hydrochlorothiazide 12.5 mg 1 tablet daily reevaluate with next visit

## 2024-04-26 NOTE — ASSESSMENT & PLAN NOTE
Reevaluate with changes in diet and exercise continued improvement in weight loss hemoglobin A1c 6.3 fasting sugar of 97 reevaluate next visit

## 2024-05-14 ENCOUNTER — ANESTHESIA EVENT (OUTPATIENT)
Dept: GASTROENTEROLOGY | Facility: HOSPITAL | Age: 49
End: 2024-05-14
Payer: COMMERCIAL

## 2024-05-16 ENCOUNTER — ANESTHESIA (OUTPATIENT)
Dept: GASTROENTEROLOGY | Facility: HOSPITAL | Age: 49
End: 2024-05-16
Payer: COMMERCIAL

## 2024-05-16 ENCOUNTER — HOSPITAL ENCOUNTER (OUTPATIENT)
Dept: GASTROENTEROLOGY | Facility: HOSPITAL | Age: 49
Discharge: HOME | End: 2024-05-16
Payer: COMMERCIAL

## 2024-05-16 VITALS
TEMPERATURE: 97 F | DIASTOLIC BLOOD PRESSURE: 95 MMHG | SYSTOLIC BLOOD PRESSURE: 124 MMHG | RESPIRATION RATE: 20 BRPM | OXYGEN SATURATION: 95 % | HEART RATE: 89 BPM

## 2024-05-16 DIAGNOSIS — D12.6 ADENOMATOUS POLYP OF COLON, UNSPECIFIED PART OF COLON: ICD-10-CM

## 2024-05-16 PROCEDURE — 7100000010 HC PHASE TWO TIME - EACH INCREMENTAL 1 MINUTE

## 2024-05-16 PROCEDURE — 3700000001 HC GENERAL ANESTHESIA TIME - INITIAL BASE CHARGE

## 2024-05-16 PROCEDURE — 2500000005 HC RX 250 GENERAL PHARMACY W/O HCPCS: Performed by: LICENSED PRACTICAL NURSE

## 2024-05-16 PROCEDURE — 7100000009 HC PHASE TWO TIME - INITIAL BASE CHARGE

## 2024-05-16 PROCEDURE — 88305 TISSUE EXAM BY PATHOLOGIST: CPT | Mod: TC,PORLAB | Performed by: SURGERY

## 2024-05-16 PROCEDURE — 2500000004 HC RX 250 GENERAL PHARMACY W/ HCPCS (ALT 636 FOR OP/ED): Performed by: LICENSED PRACTICAL NURSE

## 2024-05-16 PROCEDURE — 45385 COLONOSCOPY W/LESION REMOVAL: CPT | Performed by: SURGERY

## 2024-05-16 PROCEDURE — 3700000002 HC GENERAL ANESTHESIA TIME - EACH INCREMENTAL 1 MINUTE

## 2024-05-16 PROCEDURE — 88305 TISSUE EXAM BY PATHOLOGIST: CPT | Performed by: PATHOLOGY

## 2024-05-16 RX ORDER — PROPOFOL 10 MG/ML
INJECTION, EMULSION INTRAVENOUS AS NEEDED
Status: DISCONTINUED | OUTPATIENT
Start: 2024-05-16 | End: 2024-05-16

## 2024-05-16 RX ORDER — LIDOCAINE HYDROCHLORIDE 20 MG/ML
INJECTION, SOLUTION INFILTRATION; PERINEURAL AS NEEDED
Status: DISCONTINUED | OUTPATIENT
Start: 2024-05-16 | End: 2024-05-16

## 2024-05-16 RX ADMIN — LIDOCAINE HYDROCHLORIDE 20 MG: 20 INJECTION, SOLUTION INFILTRATION; PERINEURAL at 12:50

## 2024-05-16 RX ADMIN — PROPOFOL 20 MG: 10 INJECTION, EMULSION INTRAVENOUS at 13:12

## 2024-05-16 RX ADMIN — PROPOFOL 20 MG: 10 INJECTION, EMULSION INTRAVENOUS at 12:53

## 2024-05-16 RX ADMIN — PROPOFOL 50 MG: 10 INJECTION, EMULSION INTRAVENOUS at 13:05

## 2024-05-16 RX ADMIN — PROPOFOL 80 MG: 10 INJECTION, EMULSION INTRAVENOUS at 12:50

## 2024-05-16 RX ADMIN — PROPOFOL 50 MG: 10 INJECTION, EMULSION INTRAVENOUS at 13:10

## 2024-05-16 RX ADMIN — PROPOFOL 50 MG: 10 INJECTION, EMULSION INTRAVENOUS at 13:00

## 2024-05-16 RX ADMIN — PROPOFOL 50 MG: 10 INJECTION, EMULSION INTRAVENOUS at 12:55

## 2024-05-16 SDOH — HEALTH STABILITY: MENTAL HEALTH: CURRENT SMOKER: 1

## 2024-05-16 ASSESSMENT — COLUMBIA-SUICIDE SEVERITY RATING SCALE - C-SSRS
2. HAVE YOU ACTUALLY HAD ANY THOUGHTS OF KILLING YOURSELF?: NO
6. HAVE YOU EVER DONE ANYTHING, STARTED TO DO ANYTHING, OR PREPARED TO DO ANYTHING TO END YOUR LIFE?: NO
1. IN THE PAST MONTH, HAVE YOU WISHED YOU WERE DEAD OR WISHED YOU COULD GO TO SLEEP AND NOT WAKE UP?: NO

## 2024-05-16 NOTE — H&P
History Of Present Illness  Nato Lopez is a 49 y.o. male presenting with hx dysplastic polyp.     Past Medical History  He has a past medical history of Dyslipidemia, Elevated blood-pressure reading, without diagnosis of hypertension (08/11/2020), Goiter diffuse, nontoxic, Hyperlipidemia, Hypertension, Metabolic syndrome (04/15/2021), Other specified personal risk factors, not elsewhere classified (04/15/2021), Personal history of (healed) traumatic fracture, Personal history of nicotine dependence (08/10/2020), Personal history of other diseases of the respiratory system (11/10/2021), Personal history of other diseases of the respiratory system (11/10/2021), Personal history of other endocrine, nutritional and metabolic disease (08/10/2020), Personal history of other endocrine, nutritional and metabolic disease, and Personal history of other specified conditions (04/15/2021).    Surgical History  He has a past surgical history that includes Other surgical history (08/11/2020).     Social History  He reports that he has been smoking cigarettes. He has a 12.5 pack-year smoking history. He has never used smokeless tobacco. He reports current alcohol use of about 24.0 standard drinks of alcohol per week. He reports that he does not use drugs.    Family History  Family History   Problem Relation Name Age of Onset    Diabetes Mother      Alzheimer's disease Mother      Hypertension Father      Thyroid disease Father      Other (vascular disorder) Father      Thyroid disease Sister      Breast cancer Sister      Hypertension Brother          Allergies  Patient has no known allergies.    Review of Systems   All other systems reviewed and are negative.       Physical Exam  Vitals reviewed.   Cardiovascular:      Rate and Rhythm: Normal rate and regular rhythm.   Pulmonary:      Breath sounds: Normal breath sounds.   Skin:     General: Skin is warm and dry.   Neurological:      Mental Status: He is alert.    Psychiatric:         Mood and Affect: Mood normal.          Last Recorded Vitals  There were no vitals taken for this visit.    Relevant Results               Assessment/Plan   Active Problems:  There are no active Hospital Problems.      For colo       I spent 15 minutes in the professional and overall care of this patient.      Hugo Muñoz MD

## 2024-05-16 NOTE — ANESTHESIA POSTPROCEDURE EVALUATION
Patient: Nato Lopez    Procedure Summary       Date: 05/16/24 Room / Location: [facility]    Anesthesia Start: 1248 Anesthesia Stop: 1321    Procedure: COLONOSCOPY Diagnosis: Adenomatous polyp of colon, unspecified part of colon    Scheduled Providers: Hugo Muñoz MD Responsible Provider: BOWEN Nobles    Anesthesia Type: MAC ASA Status: 2            Anesthesia Type: MAC    Vitals Value Taken Time   /95 05/16/24 1320   Temp 36.1 °C (97 °F) 05/16/24 1320   Pulse 89 05/16/24 1320   Resp 20 05/16/24 1320   SpO2 95 % 05/16/24 1320       Anesthesia Post Evaluation    Patient location during evaluation: PACU  Patient participation: complete - patient participated  Level of consciousness: awake and alert  Pain management: adequate  Airway patency: patent  Cardiovascular status: acceptable  Respiratory status: acceptable  Hydration status: acceptable  Postoperative Nausea and Vomiting: none        There were no known notable events for this encounter.

## 2024-05-16 NOTE — ANESTHESIA PREPROCEDURE EVALUATION
Patient: Nato Lopez    Procedure Information       Date/Time: 05/16/24 1400    Scheduled providers: Hugo Muñoz MD    Procedure: COLONOSCOPY    Location: [facility]            Relevant Problems   Anesthesia (within normal limits)      Cardiac   (+) Hyperlipidemia   (+) Primary hypertension      Endocrine   (+) Goiter diffuse, nontoxic       Clinical information reviewed:   Tobacco  Allergies  Meds   Med Hx  Surg Hx   Fam Hx  Soc Hx        NPO Detail:  NPO/Void Status  Date of Last Liquid: 05/16/24  Time of Last Liquid: 0800  Date of Last Solid: 05/14/24  Time of Last Solid: 1700         Physical Exam    Airway  Mallampati: II  TM distance: >3 FB  Neck ROM: full     Cardiovascular    Dental - normal exam     Pulmonary    Abdominal            Anesthesia Plan    History of general anesthesia?: yes  History of complications of general anesthesia?: no    ASA 2     MAC     The patient is a current smoker.  Patient was previously instructed to abstain from smoking on day of procedure.  Patient smoked on day of procedure.    intravenous induction   Anesthetic plan and risks discussed with patient.

## 2024-05-24 LAB
LABORATORY COMMENT REPORT: NORMAL
PATH REPORT.FINAL DX SPEC: NORMAL
PATH REPORT.GROSS SPEC: NORMAL
PATH REPORT.MICROSCOPIC SPEC OTHER STN: NORMAL
PATH REPORT.RELEVANT HX SPEC: NORMAL
PATH REPORT.TOTAL CANCER: NORMAL

## 2024-08-09 ENCOUNTER — HOSPITAL ENCOUNTER (OUTPATIENT)
Dept: RADIOLOGY | Facility: HOSPITAL | Age: 49
Discharge: HOME | End: 2024-08-09
Payer: COMMERCIAL

## 2024-08-09 DIAGNOSIS — F17.200 CURRENT SMOKER: ICD-10-CM

## 2024-08-09 DIAGNOSIS — R91.8 MULTIPLE LUNG NODULES ON CT: Primary | ICD-10-CM

## 2024-08-09 DIAGNOSIS — E88.810 METABOLIC SYNDROME X: ICD-10-CM

## 2024-08-09 DIAGNOSIS — E78.2 MIXED HYPERLIPIDEMIA: ICD-10-CM

## 2024-08-09 DIAGNOSIS — F10.20 ALCOHOL DEPENDENCE, EPISODIC (MULTI): ICD-10-CM

## 2024-08-09 DIAGNOSIS — F17.200 NEEDS SMOKING CESSATION EDUCATION: ICD-10-CM

## 2024-08-09 DIAGNOSIS — R73.02 IMPAIRED GLUCOSE TOLERANCE: ICD-10-CM

## 2024-08-09 PROCEDURE — 71271 CT THORAX LUNG CANCER SCR C-: CPT

## 2024-08-21 ENCOUNTER — TELEPHONE (OUTPATIENT)
Dept: PRIMARY CARE | Facility: CLINIC | Age: 49
End: 2024-08-21
Payer: COMMERCIAL

## 2024-08-21 NOTE — TELEPHONE ENCOUNTER
Nato dropped off a copy of a C-spine x-ray. The [facility] is recommending Nato take a steroid.   Please review x-ray report and advise.  Placed in Dr. Stoll's inbox.  [facility]

## 2024-08-28 ENCOUNTER — OFFICE VISIT (OUTPATIENT)
Dept: PRIMARY CARE | Facility: CLINIC | Age: 49
End: 2024-08-28
Payer: COMMERCIAL

## 2024-08-28 VITALS
HEART RATE: 68 BPM | SYSTOLIC BLOOD PRESSURE: 122 MMHG | BODY MASS INDEX: 27.35 KG/M2 | HEIGHT: 74 IN | DIASTOLIC BLOOD PRESSURE: 86 MMHG

## 2024-08-28 DIAGNOSIS — M50.122 CERVICAL DISC DISORDER AT C5-C6 LEVEL WITH RADICULOPATHY: Primary | ICD-10-CM

## 2024-08-28 DIAGNOSIS — R91.1 NODULE OF UPPER LOBE OF RIGHT LUNG: ICD-10-CM

## 2024-08-28 DIAGNOSIS — K63.5 DYSPLASTIC POLYP OF COLON: ICD-10-CM

## 2024-08-28 DIAGNOSIS — L65.9 NONSCARRING HAIR LOSS: ICD-10-CM

## 2024-08-28 PROBLEM — F17.200 CURRENT SMOKER: Status: RESOLVED | Noted: 2023-04-24 | Resolved: 2024-08-28

## 2024-08-28 PROCEDURE — 3079F DIAST BP 80-89 MM HG: CPT | Performed by: INTERNAL MEDICINE

## 2024-08-28 PROCEDURE — 99214 OFFICE O/P EST MOD 30 MIN: CPT | Performed by: INTERNAL MEDICINE

## 2024-08-28 PROCEDURE — 3074F SYST BP LT 130 MM HG: CPT | Performed by: INTERNAL MEDICINE

## 2024-08-28 RX ORDER — METHYLPREDNISOLONE 4 MG/1
TABLET ORAL
Qty: 21 TABLET | Refills: 0 | Status: SHIPPED | OUTPATIENT
Start: 2024-08-28 | End: 2024-09-04

## 2024-08-28 RX ORDER — MINOXIDIL 2.5 MG/1
2.5 TABLET ORAL DAILY
Start: 2024-08-28 | End: 2025-08-28

## 2024-08-28 ASSESSMENT — ENCOUNTER SYMPTOMS
TINGLING: 0
LOSS OF SENSATION IN FEET: 0
NUMBNESS: 1
DEPRESSION: 0
NECK PAIN: 1
WEAKNESS: 0
OCCASIONAL FEELINGS OF UNSTEADINESS: 0

## 2024-08-28 NOTE — ASSESSMENT & PLAN NOTE
X-rays of cervical spine completed by chiropractic physician reveals advanced disc degeneration particularly at C5-C6 level patient having symptoms of C6 neuropathy involving left shoulder but not causing significant weakness or severe neuropathy involving the hand or wrist of his arm has been going on for approximately 8 weeks will acutely settle down inflammation with trial of Medrol Dosepak along with traction exercises and consider physical therapy if symptoms do not improve or worsen or aggravation of neurological signs of weakness or paresthesias will consider MRI of cervical spine and referral to spine surgeon for cervical decompression and/or fusion imaging not warranted at this time given that patient appears to be slowly improving advised patient not to have manipulation done with neck with chiropractic physician but okay to initiate traction exercises

## 2024-08-28 NOTE — PROGRESS NOTES
"Subjective   Patient ID: Nato Lopez is a 49 y.o. male who presents for Neck Pain (Sees a chiropractor which told him he may need a steroid ).    HPI     Review of Systems    Objective   /86   Pulse 68   Ht 1.88 m (6' 2\")   BMI 27.35 kg/m²     Physical Exam    Assessment/Plan          "

## 2024-08-28 NOTE — ASSESSMENT & PLAN NOTE
Patient initiated on minoxidil 2.5 mg oral by dermatologist with minimal response continue to follow titration as tolerated

## 2024-08-28 NOTE — ASSESSMENT & PLAN NOTE
Lung cancer screening revealed 6 mm right upper lobe lung nodule with subpleural cyst scheduled for 3-month surveillance recommended by radiology mild emphysematous changes patient has quit smoking using nicotine lozenges at this time mild subcentimeter nodules also present no lymphadenopathy mass noted continue to evaluate closely follow-up on results

## 2024-08-28 NOTE — PROGRESS NOTES
"Subjective   Reason for Visit: Nato Lopez is an 49 y.o. male here for a acute visit.     Past Medical, Surgical, and Family History reviewed and updated in chart.    Reviewed all medications by prescribing practitioner or clinical pharmacist (such as prescriptions, OTCs, herbal therapies and supplements) and documented in the medical record.    Neck Pain   This is a chronic problem. The current episode started 1 to 4 weeks ago. The problem occurs daily. The problem has been gradually improving. The pain is associated with nothing. The pain is present in the left side. The quality of the pain is described as aching. The pain is at a severity of 3/10. The pain is mild. The symptoms are aggravated by bending and position. The pain is Same all the time. Stiffness is present All day. Associated symptoms include numbness. Pertinent negatives include no tingling or weakness. He has tried neck support, NSAIDs, home exercises and chiropractic manipulation for the symptoms. The treatment provided mild relief.       Patient Care Team:  Mario Stoll DO as PCP - General  Mario Stoll DO as PCP - [facility] PCP     Review of Systems   Musculoskeletal:  Positive for neck pain.   Neurological:  Positive for numbness. Negative for tingling and weakness.   All other systems reviewed and are negative.      Objective   Vitals:  /86   Pulse 68   Ht 1.88 m (6' 2\")   BMI 27.35 kg/m²       Physical Exam  Vitals and nursing note reviewed.   Constitutional:       General: He is not in acute distress.     Appearance: Normal appearance. He is well-developed. He is not toxic-appearing.   HENT:      Head: Normocephalic and atraumatic.      Right Ear: Tympanic membrane and external ear normal.      Left Ear: Tympanic membrane and external ear normal.      Nose: Nose normal.      Mouth/Throat:      Mouth: Mucous membranes are moist.      Pharynx: Oropharynx is clear. No oropharyngeal exudate or posterior oropharyngeal " erythema.      Tonsils: No tonsillar exudate. 2+ on the right. 2+ on the left.   Eyes:      Extraocular Movements: Extraocular movements intact.      Conjunctiva/sclera: Conjunctivae normal.   Cardiovascular:      Rate and Rhythm: Normal rate and regular rhythm.      Pulses: Normal pulses.      Heart sounds: Normal heart sounds. No murmur heard.  Pulmonary:      Effort: Pulmonary effort is normal.      Breath sounds: Normal breath sounds.   Abdominal:      General: Abdomen is flat. Bowel sounds are normal.      Palpations: Abdomen is soft.   Musculoskeletal:      Cervical back: Neck supple.   Feet:      Right foot:      Skin integrity: Skin integrity normal. No ulcer, blister, skin breakdown, erythema, warmth or callus.      Toenail Condition: Right toenails are normal.      Left foot:      Skin integrity: Skin integrity normal. No ulcer, blister, skin breakdown, erythema, warmth or callus.      Toenail Condition: Left toenails are normal.   Lymphadenopathy:      Cervical: No cervical adenopathy.   Skin:     General: Skin is warm and dry.      Capillary Refill: Capillary refill takes more than 3 seconds.      Findings: No rash.   Neurological:      General: No focal deficit present.      Mental Status: He is alert. Mental status is at baseline. He is disoriented.      Cranial Nerves: No cranial nerve deficit.      Sensory: Sensation is intact. No sensory deficit.      Motor: No weakness.      Coordination: Coordination normal.      Gait: Gait normal.      Deep Tendon Reflexes: Reflexes normal.   Psychiatric:         Mood and Affect: Mood normal.         Behavior: Behavior normal.         Thought Content: Thought content normal.         Judgment: Judgment normal.         Assessment/Plan   Problem List Items Addressed This Visit             ICD-10-CM    Nonscarring hair loss L65.9     Patient initiated on minoxidil 2.5 mg oral by dermatologist with minimal response continue to follow titration as tolerated          Relevant Medications    minoxidil (Loniten) 2.5 mg tablet    Dysplastic polyp of colon K63.5     Scheduled for repeat colonoscopy this September for follow-up on dysplasia         Nodule of upper lobe of right lung R91.1     Lung cancer screening revealed 6 mm right upper lobe lung nodule with subpleural cyst scheduled for 3-month surveillance recommended by radiology mild emphysematous changes patient has quit smoking using nicotine lozenges at this time mild subcentimeter nodules also present no lymphadenopathy mass noted continue to evaluate closely follow-up on results         Cervical disc disorder at C5-C6 level with radiculopathy - Primary M50.122     X-rays of cervical spine completed by chiropractic physician reveals advanced disc degeneration particularly at C5-C6 level patient having symptoms of C6 neuropathy involving left shoulder but not causing significant weakness or severe neuropathy involving the hand or wrist of his arm has been going on for approximately 8 weeks will acutely settle down inflammation with trial of Medrol Dosepak along with traction exercises and consider physical therapy if symptoms do not improve or worsen or aggravation of neurological signs of weakness or paresthesias will consider MRI of cervical spine and referral to spine surgeon for cervical decompression and/or fusion imaging not warranted at this time given that patient appears to be slowly improving advised patient not to have manipulation done with neck with chiropractic physician but okay to initiate traction exercises         Relevant Medications    methylPREDNISolone (Medrol Dospak) 4 mg tablets

## 2024-09-05 ENCOUNTER — HOSPITAL ENCOUNTER (OUTPATIENT)
Dept: GASTROENTEROLOGY | Facility: HOSPITAL | Age: 49
Discharge: HOME | End: 2024-09-05
Payer: COMMERCIAL

## 2024-09-05 ENCOUNTER — ANESTHESIA EVENT (OUTPATIENT)
Dept: GASTROENTEROLOGY | Facility: HOSPITAL | Age: 49
End: 2024-09-05
Payer: COMMERCIAL

## 2024-09-05 ENCOUNTER — ANESTHESIA (OUTPATIENT)
Dept: GASTROENTEROLOGY | Facility: HOSPITAL | Age: 49
End: 2024-09-05
Payer: COMMERCIAL

## 2024-09-05 VITALS
RESPIRATION RATE: 19 BRPM | HEART RATE: 71 BPM | TEMPERATURE: 98.5 F | SYSTOLIC BLOOD PRESSURE: 157 MMHG | WEIGHT: 213 LBS | HEIGHT: 74 IN | BODY MASS INDEX: 27.34 KG/M2 | DIASTOLIC BLOOD PRESSURE: 96 MMHG | OXYGEN SATURATION: 96 %

## 2024-09-05 DIAGNOSIS — M50.122 CERVICAL DISC DISORDER AT C5-C6 LEVEL WITH RADICULOPATHY: ICD-10-CM

## 2024-09-05 DIAGNOSIS — K63.5 POLYP OF COLON, UNSPECIFIED PART OF COLON, UNSPECIFIED TYPE: ICD-10-CM

## 2024-09-05 PROCEDURE — 2500000004 HC RX 250 GENERAL PHARMACY W/ HCPCS (ALT 636 FOR OP/ED): Performed by: NURSE ANESTHETIST, CERTIFIED REGISTERED

## 2024-09-05 PROCEDURE — 7100000009 HC PHASE TWO TIME - INITIAL BASE CHARGE

## 2024-09-05 PROCEDURE — 3700000001 HC GENERAL ANESTHESIA TIME - INITIAL BASE CHARGE

## 2024-09-05 PROCEDURE — 2500000005 HC RX 250 GENERAL PHARMACY W/O HCPCS: Performed by: NURSE ANESTHETIST, CERTIFIED REGISTERED

## 2024-09-05 PROCEDURE — 45380 COLONOSCOPY AND BIOPSY: CPT | Performed by: SURGERY

## 2024-09-05 PROCEDURE — 3700000002 HC GENERAL ANESTHESIA TIME - EACH INCREMENTAL 1 MINUTE

## 2024-09-05 PROCEDURE — 7100000010 HC PHASE TWO TIME - EACH INCREMENTAL 1 MINUTE

## 2024-09-05 RX ORDER — SODIUM CHLORIDE 9 MG/ML
20 INJECTION, SOLUTION INTRAVENOUS CONTINUOUS
Status: CANCELLED | OUTPATIENT
Start: 2024-09-05

## 2024-09-05 RX ORDER — FENTANYL CITRATE 50 UG/ML
INJECTION, SOLUTION INTRAMUSCULAR; INTRAVENOUS AS NEEDED
Status: DISCONTINUED | OUTPATIENT
Start: 2024-09-05 | End: 2024-09-05

## 2024-09-05 RX ORDER — LIDOCAINE HYDROCHLORIDE 20 MG/ML
INJECTION, SOLUTION INFILTRATION; PERINEURAL AS NEEDED
Status: DISCONTINUED | OUTPATIENT
Start: 2024-09-05 | End: 2024-09-05

## 2024-09-05 RX ORDER — METHYLPREDNISOLONE 4 MG/1
TABLET ORAL
Qty: 21 TABLET | Refills: 0 | Status: SHIPPED | OUTPATIENT
Start: 2024-09-05 | End: 2024-09-12

## 2024-09-05 RX ORDER — SODIUM CHLORIDE 9 MG/ML
INJECTION, SOLUTION INTRAVENOUS CONTINUOUS PRN
Status: DISCONTINUED | OUTPATIENT
Start: 2024-09-05 | End: 2024-09-05

## 2024-09-05 RX ORDER — PROPOFOL 10 MG/ML
INJECTION, EMULSION INTRAVENOUS AS NEEDED
Status: DISCONTINUED | OUTPATIENT
Start: 2024-09-05 | End: 2024-09-05

## 2024-09-05 SDOH — HEALTH STABILITY: MENTAL HEALTH: CURRENT SMOKER: 1

## 2024-09-05 ASSESSMENT — PAIN SCALES - GENERAL
PAINLEVEL_OUTOF10: 0 - NO PAIN
PAINLEVEL_OUTOF10: 0 - NO PAIN
PAIN_LEVEL: 0
PAINLEVEL_OUTOF10: 0 - NO PAIN
PAINLEVEL_OUTOF10: 0 - NO PAIN

## 2024-09-05 ASSESSMENT — PAIN - FUNCTIONAL ASSESSMENT
PAIN_FUNCTIONAL_ASSESSMENT: 0-10
PAIN_FUNCTIONAL_ASSESSMENT: 0-10

## 2024-09-05 NOTE — H&P
History Of Present Illness  Nato Lopez is a 49 y.o. male presenting with hx polyp w/ hgd.     Past Medical History  He has a past medical history of Dyslipidemia, Elevated blood-pressure reading, without diagnosis of hypertension (08/11/2020), Goiter diffuse, nontoxic, Hyperlipidemia, Hypertension, Metabolic syndrome (04/15/2021), Other specified personal risk factors, not elsewhere classified (04/15/2021), Personal history of (healed) traumatic fracture, Personal history of nicotine dependence (08/10/2020), Personal history of other diseases of the respiratory system (11/10/2021), Personal history of other diseases of the respiratory system (11/10/2021), Personal history of other endocrine, nutritional and metabolic disease (08/10/2020), Personal history of other endocrine, nutritional and metabolic disease, and Personal history of other specified conditions (04/15/2021).    Surgical History  He has a past surgical history that includes Other surgical history (08/11/2020).     Social History  He reports that he has quit smoking. His smoking use included cigarettes. He has a 12.5 pack-year smoking history. He uses smokeless tobacco. He reports current alcohol use of about 24.0 standard drinks of alcohol per week. He reports current drug use. Drug: Marijuana.    Family History  Family History   Problem Relation Name Age of Onset    Diabetes Mother      Alzheimer's disease Mother      Hypertension Father      Thyroid disease Father      Other (vascular disorder) Father      Thyroid disease Sister      Breast cancer Sister      Hypertension Brother          Allergies  Patient has no known allergies.    Review of Systems   All other systems reviewed and are negative.       Physical Exam  Vitals reviewed.   Cardiovascular:      Rate and Rhythm: Normal rate and regular rhythm.   Pulmonary:      Breath sounds: Normal breath sounds.   Skin:     General: Skin is warm and dry.   Neurological:      Mental Status: He is  "alert.   Psychiatric:         Mood and Affect: Mood normal.          Last Recorded Vitals  Blood pressure (!) 139/114, pulse 78, temperature 36.5 °C (97.7 °F), temperature source Temporal, resp. rate 16, height 1.88 m (6' 2\"), weight 96.6 kg (213 lb), SpO2 98%.    Relevant Results               Assessment/Plan   Assessment & Plan  Polyp of colon, unspecified part of colon, unspecified type      For colo       I spent 15 minutes in the professional and overall care of this patient.      Hugo Muñoz MD  "

## 2024-09-05 NOTE — ANESTHESIA PREPROCEDURE EVALUATION
Patient: Nato Lopez    Procedure Information       Date/Time: 09/05/24 0930    Scheduled providers: Hugo Muñoz MD    Procedure: COLONOSCOPY    Location: [facility]            Relevant Problems   Anesthesia (within normal limits)      Cardiac   (+) Hyperlipidemia   (+) Primary hypertension      Pulmonary (within normal limits)      Neuro (within normal limits)      GI (within normal limits)      /Renal (within normal limits)      Liver (within normal limits)      Endocrine   (+) Goiter diffuse, nontoxic      Hematology (within normal limits)      HEENT (within normal limits)       Clinical information reviewed:   Tobacco  Allergies  Meds   Med Hx  Surg Hx   Fam Hx  Soc Hx        NPO Detail:  NPO/Void Status  Date of Last Liquid: 09/03/24  Time of Last Liquid: 1900  Date of Last Solid: 09/04/24  Time of Last Solid: 1100  Time of Last Void: 0800         Physical Exam    Airway  Mallampati: II  TM distance: >3 FB  Neck ROM: full     Cardiovascular - normal exam  Rhythm: regular     Dental - normal exam     Pulmonary - normal exam     Abdominal - normal exam             Anesthesia Plan    History of general anesthesia?: yes  History of complications of general anesthesia?: no    ASA 2     MAC     The patient is a current smoker.  Patient was previously instructed to abstain from smoking on day of procedure.    intravenous induction   Anesthetic plan and risks discussed with patient.

## 2024-09-05 NOTE — ANESTHESIA POSTPROCEDURE EVALUATION
Patient: Nato Lopez    Procedure Summary       Date: 09/05/24 Room / Location: [facility]    Anesthesia Start: 0927 Anesthesia Stop: 0957    Procedure: COLONOSCOPY Diagnosis: Polyp of colon, unspecified part of colon, unspecified type    Scheduled Providers: Hugo Muñoz MD Responsible Provider: BOWEN Lopez    Anesthesia Type: MAC ASA Status: 2            Anesthesia Type: MAC    Vitals Value Taken Time   /96 09/05/24 1026   Temp 36.9 °C (98.5 °F) 09/05/24 1026   Pulse 71 09/05/24 1026   Resp 19 09/05/24 1026   SpO2 96 % 09/05/24 1026       Anesthesia Post Evaluation    Patient location during evaluation: bedside  Patient participation: complete - patient participated  Level of consciousness: awake and alert  Pain score: 0  Pain management: adequate  Airway patency: patent  Cardiovascular status: acceptable  Respiratory status: acceptable  Hydration status: acceptable  Postoperative Nausea and Vomiting: none        There were no known notable events for this encounter.

## 2024-10-16 ENCOUNTER — APPOINTMENT (OUTPATIENT)
Dept: ORTHOPEDIC SURGERY | Facility: CLINIC | Age: 49
End: 2024-10-16
Payer: COMMERCIAL

## 2024-10-20 DIAGNOSIS — E88.810 METABOLIC SYNDROME X: ICD-10-CM

## 2024-10-20 DIAGNOSIS — E66.3 OVERWEIGHT WITH BODY MASS INDEX (BMI) OF 28 TO 28.9 IN ADULT: ICD-10-CM

## 2024-10-20 DIAGNOSIS — F17.200 NEEDS SMOKING CESSATION EDUCATION: ICD-10-CM

## 2024-10-20 DIAGNOSIS — E78.2 MIXED HYPERLIPIDEMIA: ICD-10-CM

## 2024-10-20 DIAGNOSIS — F10.20 ALCOHOL DEPENDENCE, EPISODIC (MULTI): ICD-10-CM

## 2024-10-20 DIAGNOSIS — F17.210 SMOKING GREATER THAN 25 PACK YEARS: ICD-10-CM

## 2024-10-20 DIAGNOSIS — F17.200 CURRENT SMOKER: ICD-10-CM

## 2024-10-20 DIAGNOSIS — Z12.11 COLON CANCER SCREENING: ICD-10-CM

## 2024-10-21 RX ORDER — ROSUVASTATIN CALCIUM 10 MG/1
10 TABLET, COATED ORAL DAILY
Qty: 90 TABLET | Refills: 3 | Status: SHIPPED | OUTPATIENT
Start: 2024-10-21

## 2024-10-24 ENCOUNTER — LAB (OUTPATIENT)
Dept: LAB | Facility: LAB | Age: 49
End: 2024-10-24
Payer: COMMERCIAL

## 2024-10-24 DIAGNOSIS — E88.810 METABOLIC SYNDROME X: ICD-10-CM

## 2024-10-24 DIAGNOSIS — F10.20 ALCOHOL DEPENDENCE, EPISODIC (MULTI): ICD-10-CM

## 2024-10-24 DIAGNOSIS — E78.2 MIXED HYPERLIPIDEMIA: ICD-10-CM

## 2024-10-24 DIAGNOSIS — F17.200 NEEDS SMOKING CESSATION EDUCATION: ICD-10-CM

## 2024-10-24 DIAGNOSIS — Z12.5 PROSTATE CANCER SCREENING: ICD-10-CM

## 2024-10-24 DIAGNOSIS — F17.200 CURRENT SMOKER: ICD-10-CM

## 2024-10-24 DIAGNOSIS — R73.02 IMPAIRED GLUCOSE TOLERANCE: ICD-10-CM

## 2024-10-24 LAB
ALBUMIN SERPL BCP-MCNC: 4.4 G/DL (ref 3.4–5)
ALP SERPL-CCNC: 16 U/L (ref 33–120)
ALT SERPL W P-5'-P-CCNC: 19 U/L (ref 10–52)
ANION GAP SERPL CALC-SCNC: 14 MMOL/L (ref 10–20)
AST SERPL W P-5'-P-CCNC: 18 U/L (ref 9–39)
BILIRUB SERPL-MCNC: 0.6 MG/DL (ref 0–1.2)
BUN SERPL-MCNC: 18 MG/DL (ref 6–23)
CALCIUM SERPL-MCNC: 9.3 MG/DL (ref 8.6–10.3)
CHLORIDE SERPL-SCNC: 103 MMOL/L (ref 98–107)
CHOLEST SERPL-MCNC: 157 MG/DL (ref 0–199)
CHOLESTEROL/HDL RATIO: 2
CO2 SERPL-SCNC: 27 MMOL/L (ref 21–32)
CREAT SERPL-MCNC: 0.96 MG/DL (ref 0.5–1.3)
CREAT UR-MCNC: 159.6 MG/DL (ref 20–370)
EGFRCR SERPLBLD CKD-EPI 2021: >90 ML/MIN/1.73M*2
EST. AVERAGE GLUCOSE BLD GHB EST-MCNC: 120 MG/DL
GLUCOSE SERPL-MCNC: 99 MG/DL (ref 74–99)
HBA1C MFR BLD: 5.8 %
HDLC SERPL-MCNC: 77 MG/DL
LDLC SERPL CALC-MCNC: 46 MG/DL
MICROALBUMIN UR-MCNC: <7 MG/L
MICROALBUMIN/CREAT UR: NORMAL MG/G{CREAT}
NON HDL CHOLESTEROL: 80 MG/DL (ref 0–149)
POTASSIUM SERPL-SCNC: 4 MMOL/L (ref 3.5–5.3)
PROT SERPL-MCNC: 6.4 G/DL (ref 6.4–8.2)
PSA SERPL-MCNC: 0.69 NG/ML
SODIUM SERPL-SCNC: 140 MMOL/L (ref 136–145)
TRIGL SERPL-MCNC: 171 MG/DL (ref 0–149)
VLDL: 34 MG/DL (ref 0–40)

## 2024-10-24 PROCEDURE — 84153 ASSAY OF PSA TOTAL: CPT

## 2024-10-24 PROCEDURE — 80053 COMPREHEN METABOLIC PANEL: CPT

## 2024-10-24 PROCEDURE — 82043 UR ALBUMIN QUANTITATIVE: CPT

## 2024-10-24 PROCEDURE — 36415 COLL VENOUS BLD VENIPUNCTURE: CPT

## 2024-10-24 PROCEDURE — 80061 LIPID PANEL: CPT

## 2024-10-24 PROCEDURE — 82570 ASSAY OF URINE CREATININE: CPT

## 2024-10-24 PROCEDURE — 83036 HEMOGLOBIN GLYCOSYLATED A1C: CPT

## 2024-10-29 ENCOUNTER — APPOINTMENT (OUTPATIENT)
Dept: PRIMARY CARE | Facility: CLINIC | Age: 49
End: 2024-10-29
Payer: COMMERCIAL

## 2024-10-29 VITALS
SYSTOLIC BLOOD PRESSURE: 118 MMHG | DIASTOLIC BLOOD PRESSURE: 80 MMHG | BODY MASS INDEX: 27.46 KG/M2 | HEART RATE: 72 BPM | WEIGHT: 214 LBS | HEIGHT: 74 IN

## 2024-10-29 DIAGNOSIS — R73.02 IGT (IMPAIRED GLUCOSE TOLERANCE): ICD-10-CM

## 2024-10-29 DIAGNOSIS — I10 PRIMARY HYPERTENSION: Primary | ICD-10-CM

## 2024-10-29 DIAGNOSIS — E88.810 METABOLIC SYNDROME X: ICD-10-CM

## 2024-10-29 DIAGNOSIS — F17.200 CURRENT SMOKER: ICD-10-CM

## 2024-10-29 DIAGNOSIS — R73.02 IMPAIRED GLUCOSE TOLERANCE: ICD-10-CM

## 2024-10-29 DIAGNOSIS — F17.200 NEEDS SMOKING CESSATION EDUCATION: ICD-10-CM

## 2024-10-29 DIAGNOSIS — F10.20 ALCOHOL DEPENDENCE, EPISODIC (MULTI): ICD-10-CM

## 2024-10-29 DIAGNOSIS — K63.5 DYSPLASTIC POLYP OF COLON: ICD-10-CM

## 2024-10-29 DIAGNOSIS — E78.2 MIXED HYPERLIPIDEMIA: ICD-10-CM

## 2024-10-29 PROCEDURE — 3074F SYST BP LT 130 MM HG: CPT | Performed by: INTERNAL MEDICINE

## 2024-10-29 PROCEDURE — 99213 OFFICE O/P EST LOW 20 MIN: CPT | Performed by: INTERNAL MEDICINE

## 2024-10-29 PROCEDURE — 3079F DIAST BP 80-89 MM HG: CPT | Performed by: INTERNAL MEDICINE

## 2024-10-29 PROCEDURE — 3008F BODY MASS INDEX DOCD: CPT | Performed by: INTERNAL MEDICINE

## 2024-10-29 RX ORDER — MINOXIDIL 2.5 MG/1
2.5 TABLET ORAL DAILY
COMMUNITY

## 2024-10-29 ASSESSMENT — ENCOUNTER SYMPTOMS
DEPRESSION: 0
OCCASIONAL FEELINGS OF UNSTEADINESS: 0
LOSS OF SENSATION IN FEET: 0

## 2024-10-29 ASSESSMENT — ANXIETY QUESTIONNAIRES
IF YOU CHECKED OFF ANY PROBLEMS ON THIS QUESTIONNAIRE, HOW DIFFICULT HAVE THESE PROBLEMS MADE IT FOR YOU TO DO YOUR WORK, TAKE CARE OF THINGS AT HOME, OR GET ALONG WITH OTHER PEOPLE: NOT DIFFICULT AT ALL
5. BEING SO RESTLESS THAT IT IS HARD TO SIT STILL: NOT AT ALL
3. WORRYING TOO MUCH ABOUT DIFFERENT THINGS: NOT AT ALL
2. NOT BEING ABLE TO STOP OR CONTROL WORRYING: NOT AT ALL
4. TROUBLE RELAXING: NOT AT ALL
7. FEELING AFRAID AS IF SOMETHING AWFUL MIGHT HAPPEN: NOT AT ALL
1. FEELING NERVOUS, ANXIOUS, OR ON EDGE: SEVERAL DAYS
6. BECOMING EASILY ANNOYED OR IRRITABLE: NOT AT ALL
GAD7 TOTAL SCORE: 1

## 2024-10-29 ASSESSMENT — PATIENT HEALTH QUESTIONNAIRE - PHQ9
2. FEELING DOWN, DEPRESSED OR HOPELESS: SEVERAL DAYS
10. IF YOU CHECKED OFF ANY PROBLEMS, HOW DIFFICULT HAVE THESE PROBLEMS MADE IT FOR YOU TO DO YOUR WORK, TAKE CARE OF THINGS AT HOME, OR GET ALONG WITH OTHER PEOPLE: NOT DIFFICULT AT ALL
1. LITTLE INTEREST OR PLEASURE IN DOING THINGS: NOT AT ALL
SUM OF ALL RESPONSES TO PHQ9 QUESTIONS 1 AND 2: 1

## 2024-11-11 ENCOUNTER — HOSPITAL ENCOUNTER (OUTPATIENT)
Dept: RADIOLOGY | Facility: HOSPITAL | Age: 49
Discharge: HOME | End: 2024-11-11
Payer: COMMERCIAL

## 2024-11-11 DIAGNOSIS — R91.1 NODULE OF UPPER LOBE OF RIGHT LUNG: Primary | ICD-10-CM

## 2024-11-11 DIAGNOSIS — R91.8 MULTIPLE LUNG NODULES ON CT: ICD-10-CM

## 2024-11-11 PROCEDURE — 76380 CAT SCAN FOLLOW-UP STUDY: CPT | Performed by: RADIOLOGY

## 2024-11-11 PROCEDURE — 71250 CT THORAX DX C-: CPT

## 2025-02-27 ENCOUNTER — ANESTHESIA EVENT (OUTPATIENT)
Dept: GASTROENTEROLOGY | Facility: HOSPITAL | Age: 50
End: 2025-02-27
Payer: COMMERCIAL

## 2025-03-03 ENCOUNTER — HOSPITAL ENCOUNTER (OUTPATIENT)
Dept: GASTROENTEROLOGY | Facility: HOSPITAL | Age: 50
Discharge: HOME | End: 2025-03-03
Payer: COMMERCIAL

## 2025-03-03 ENCOUNTER — ANESTHESIA (OUTPATIENT)
Dept: GASTROENTEROLOGY | Facility: HOSPITAL | Age: 50
End: 2025-03-03
Payer: COMMERCIAL

## 2025-03-03 VITALS
HEART RATE: 81 BPM | HEIGHT: 74 IN | SYSTOLIC BLOOD PRESSURE: 151 MMHG | BODY MASS INDEX: 27.46 KG/M2 | RESPIRATION RATE: 20 BRPM | TEMPERATURE: 97.7 F | OXYGEN SATURATION: 96 % | DIASTOLIC BLOOD PRESSURE: 101 MMHG | WEIGHT: 214 LBS

## 2025-03-03 DIAGNOSIS — K62.1 RECTAL POLYP: ICD-10-CM

## 2025-03-03 DIAGNOSIS — K63.5 POLYP OF COLON, UNSPECIFIED PART OF COLON, UNSPECIFIED TYPE: Primary | ICD-10-CM

## 2025-03-03 PROCEDURE — 7100000010 HC PHASE TWO TIME - EACH INCREMENTAL 1 MINUTE

## 2025-03-03 PROCEDURE — 45380 COLONOSCOPY AND BIOPSY: CPT | Performed by: SURGERY

## 2025-03-03 PROCEDURE — 3700000001 HC GENERAL ANESTHESIA TIME - INITIAL BASE CHARGE

## 2025-03-03 PROCEDURE — 7100000009 HC PHASE TWO TIME - INITIAL BASE CHARGE

## 2025-03-03 PROCEDURE — 3700000002 HC GENERAL ANESTHESIA TIME - EACH INCREMENTAL 1 MINUTE

## 2025-03-03 PROCEDURE — 2500000004 HC RX 250 GENERAL PHARMACY W/ HCPCS (ALT 636 FOR OP/ED): Performed by: NURSE ANESTHETIST, CERTIFIED REGISTERED

## 2025-03-03 RX ORDER — SODIUM CHLORIDE 0.9 % (FLUSH) 0.9 %
SYRINGE (ML) INJECTION AS NEEDED
Status: DISCONTINUED | OUTPATIENT
Start: 2025-03-03 | End: 2025-03-03

## 2025-03-03 RX ORDER — LIDOCAINE HCL/PF 100 MG/5ML
SYRINGE (ML) INTRAVENOUS AS NEEDED
Status: DISCONTINUED | OUTPATIENT
Start: 2025-03-03 | End: 2025-03-03

## 2025-03-03 RX ORDER — PROPOFOL 10 MG/ML
INJECTION, EMULSION INTRAVENOUS AS NEEDED
Status: DISCONTINUED | OUTPATIENT
Start: 2025-03-03 | End: 2025-03-03

## 2025-03-03 RX ADMIN — LIDOCAINE HYDROCHLORIDE 100 MG: 20 INJECTION, SOLUTION INTRAVENOUS at 08:02

## 2025-03-03 RX ADMIN — PROPOFOL 450 MG: 10 INJECTION, EMULSION INTRAVENOUS at 08:02

## 2025-03-03 RX ADMIN — Medication 5 ML: at 08:26

## 2025-03-03 SDOH — HEALTH STABILITY: MENTAL HEALTH: CURRENT SMOKER: 0

## 2025-03-03 ASSESSMENT — PAIN - FUNCTIONAL ASSESSMENT
PAIN_FUNCTIONAL_ASSESSMENT: 0-10

## 2025-03-03 ASSESSMENT — PAIN DESCRIPTION - DESCRIPTORS: DESCRIPTORS: CRAMPING

## 2025-03-03 ASSESSMENT — PAIN SCALES - GENERAL
PAINLEVEL_OUTOF10: 0 - NO PAIN
PAINLEVEL_OUTOF10: 2
PAINLEVEL_OUTOF10: 0 - NO PAIN
PAINLEVEL_OUTOF10: 2

## 2025-03-03 NOTE — ANESTHESIA POSTPROCEDURE EVALUATION
Patient: Nato Lopez    Procedure Summary       Date: 03/03/25 Room / Location: [facility]    Anesthesia Start: 0754 Anesthesia Stop: 0835    Procedure: COLONOSCOPY Diagnosis:       Polyp of colon, unspecified part of colon, unspecified type      Rectal polyp      Polyp of colon, unspecified part of colon, unspecified type    Scheduled Providers: Hugo Muñoz MD Responsible Provider: BOWEN Baig    Anesthesia Type: MAC ASA Status: 2            Anesthesia Type: MAC    Vitals Value Taken Time   /97 03/03/25 0831   Temp 36.9 °C (98.4 °F) 03/03/25 0831   Pulse 85 03/03/25 0831   Resp 20 03/03/25 0831   SpO2 94 % 03/03/25 0831       Anesthesia Post Evaluation    Patient location during evaluation: bedside  Patient participation: complete - patient participated  Level of consciousness: awake  Pain management: adequate  Airway patency: patent  Cardiovascular status: acceptable  Respiratory status: acceptable  Hydration status: acceptable  Postoperative Nausea and Vomiting: none    There were no known notable events for this encounter.

## 2025-03-03 NOTE — ANESTHESIA PREPROCEDURE EVALUATION
Patient: Nato Lopez    Procedure Information       Date/Time: 03/03/25 0800    Scheduled providers: Hugo Muñoz MD    Procedure: COLONOSCOPY    Location: [facility]            Relevant Problems   Cardiac   (+) Hyperlipidemia   (+) Primary hypertension      Endocrine   (+) Goiter diffuse, nontoxic       Clinical information reviewed:   Tobacco  Allergies  Meds   Med Hx  Surg Hx   Fam Hx  Soc Hx        NPO Detail:  NPO/Void Status  Date of Last Liquid: 03/03/25  Time of Last Liquid: 0000  Date of Last Solid: 03/01/25  Time of Last Solid: 2100  Last Intake Type: Clear fluids  Time of Last Void: 0714         Physical Exam    Airway  Mallampati: II     Cardiovascular - normal exam     Dental    Pulmonary - normal exam     Abdominal        Anesthesia Plan    History of general anesthesia?: yes  History of complications of general anesthesia?: no    ASA 2     MAC     The patient is not a current smoker.    Anesthetic plan and risks discussed with patient.  Use of blood products discussed with who consented to blood products.

## 2025-03-03 NOTE — H&P
History Of Present Illness  Nato Lopez is a 49 y.o. male presenting with hx polyps.     Past Medical History  He has a past medical history of Dyslipidemia, Elevated blood-pressure reading, without diagnosis of hypertension (08/11/2020), Goiter diffuse, nontoxic, Hyperlipidemia, Hypertension, Metabolic syndrome (04/15/2021), Other specified personal risk factors, not elsewhere classified (04/15/2021), Personal history of (healed) traumatic fracture, Personal history of nicotine dependence (08/10/2020), Personal history of other diseases of the respiratory system (11/10/2021), Personal history of other diseases of the respiratory system (11/10/2021), Personal history of other endocrine, nutritional and metabolic disease (08/10/2020), Personal history of other endocrine, nutritional and metabolic disease, and Personal history of other specified conditions (04/15/2021).    Surgical History  He has a past surgical history that includes Other surgical history (08/11/2020).     Social History  He reports that he has quit smoking. His smoking use included cigarettes. He has a 12.5 pack-year smoking history. He uses smokeless tobacco. He reports current alcohol use of about 24.0 standard drinks of alcohol per week. He reports that he does not currently use drugs after having used the following drugs: Marijuana.    Family History  Family History   Problem Relation Name Age of Onset    Diabetes Mother      Alzheimer's disease Mother      Hypertension Father      Thyroid disease Father      Other (vascular disorder) Father      Thyroid disease Sister      Breast cancer Sister      Hypertension Brother          Allergies  Patient has no known allergies.    Review of Systems   All other systems reviewed and are negative.       Physical Exam  Vitals reviewed.   Cardiovascular:      Rate and Rhythm: Normal rate and regular rhythm.   Pulmonary:      Breath sounds: Normal breath sounds.   Skin:     General: Skin is warm and  "dry.   Neurological:      Mental Status: He is alert.   Psychiatric:         Mood and Affect: Mood normal.          Last Recorded Vitals  Blood pressure (!) 152/106, pulse 97, temperature 36.5 °C (97.7 °F), temperature source Temporal, resp. rate 16, height 1.88 m (6' 2\"), weight 97.1 kg (214 lb), SpO2 97%.    Relevant Results               Assessment/Plan   Assessment & Plan  Polyp of colon, unspecified part of colon, unspecified type    Rectal polyp      For colo       I spent 15 minutes in the professional and overall care of this patient.      Hugo Muñoz MD  "

## 2025-04-10 ENCOUNTER — ANCILLARY ORDERS (OUTPATIENT)
Dept: RADIOLOGY | Facility: HOSPITAL | Age: 50
End: 2025-04-10
Payer: COMMERCIAL

## 2025-04-10 DIAGNOSIS — Z12.2: ICD-10-CM

## 2025-04-10 DIAGNOSIS — R91.8 OTHER NONSPECIFIC ABNORMAL FINDING OF LUNG FIELD: Primary | ICD-10-CM

## 2025-04-10 DIAGNOSIS — Z87.891: ICD-10-CM

## 2025-04-16 ENCOUNTER — TELEPHONE (OUTPATIENT)
Dept: RADIOLOGY | Facility: HOSPITAL | Age: 50
End: 2025-04-16
Payer: COMMERCIAL

## 2025-04-16 NOTE — TELEPHONE ENCOUNTER
Call placed to central scheduling to change the currently scheduled [id number] to the needed [id number] order that is in the patient chart.

## 2025-04-21 ENCOUNTER — TELEPHONE (OUTPATIENT)
Dept: RADIOLOGY | Facility: HOSPITAL | Age: 50
End: 2025-04-21
Payer: COMMERCIAL

## 2025-04-21 NOTE — TELEPHONE ENCOUNTER
Patient called to move CT of the chest appointment to a sooner day and time due to employment situation.

## 2025-04-23 DIAGNOSIS — E88.810 METABOLIC SYNDROME X: ICD-10-CM

## 2025-04-23 DIAGNOSIS — F10.20 ALCOHOL DEPENDENCE, EPISODIC (MULTI): ICD-10-CM

## 2025-04-23 DIAGNOSIS — R73.02 IMPAIRED GLUCOSE TOLERANCE: ICD-10-CM

## 2025-04-23 DIAGNOSIS — E78.2 MIXED HYPERLIPIDEMIA: ICD-10-CM

## 2025-04-23 DIAGNOSIS — F17.200 CURRENT SMOKER: ICD-10-CM

## 2025-04-23 DIAGNOSIS — F17.200 NEEDS SMOKING CESSATION EDUCATION: ICD-10-CM

## 2025-04-23 RX ORDER — HYDROCHLOROTHIAZIDE 12.5 MG/1
12.5 TABLET ORAL DAILY
Qty: 90 TABLET | Refills: 3 | Status: SHIPPED | OUTPATIENT
Start: 2025-04-23

## 2025-04-23 ASSESSMENT — PROMIS GLOBAL HEALTH SCALE
RATE_GENERAL_HEALTH: GOOD
CARRYOUT_PHYSICAL_ACTIVITIES: COMPLETELY
CARRYOUT_SOCIAL_ACTIVITIES: FAIR
RATE_PHYSICAL_HEALTH: GOOD
RATE_AVERAGE_PAIN: 1
RATE_SOCIAL_SATISFACTION: FAIR
EMOTIONAL_PROBLEMS: SOMETIMES
RATE_QUALITY_OF_LIFE: VERY GOOD
RATE_MENTAL_HEALTH: GOOD
RATE_AVERAGE_FATIGUE: MILD

## 2025-04-25 LAB
ALBUMIN SERPL-MCNC: 4.8 G/DL (ref 3.6–5.1)
ALP SERPL-CCNC: 16 U/L (ref 35–144)
ALT SERPL-CCNC: 22 U/L (ref 9–46)
ANION GAP SERPL CALCULATED.4IONS-SCNC: 11 MMOL/L (CALC) (ref 7–17)
AST SERPL-CCNC: 17 U/L (ref 10–35)
BILIRUB SERPL-MCNC: 0.6 MG/DL (ref 0.2–1.2)
BUN SERPL-MCNC: 21 MG/DL (ref 7–25)
CALCIUM SERPL-MCNC: 10.2 MG/DL (ref 8.6–10.3)
CHLORIDE SERPL-SCNC: 101 MMOL/L (ref 98–110)
CHOLEST SERPL-MCNC: 184 MG/DL
CHOLEST/HDLC SERPL: 2.4 (CALC)
CO2 SERPL-SCNC: 29 MMOL/L (ref 20–32)
CREAT SERPL-MCNC: 0.84 MG/DL (ref 0.7–1.3)
EGFRCR SERPLBLD CKD-EPI 2021: 105 ML/MIN/1.73M2
EST. AVERAGE GLUCOSE BLD GHB EST-MCNC: 117 MG/DL
EST. AVERAGE GLUCOSE BLD GHB EST-SCNC: 6.5 MMOL/L
GLUCOSE SERPL-MCNC: 108 MG/DL (ref 65–99)
HBA1C MFR BLD: 5.7 %
HDLC SERPL-MCNC: 78 MG/DL
LDLC SERPL CALC-MCNC: 88 MG/DL (CALC)
NONHDLC SERPL-MCNC: 106 MG/DL (CALC)
POTASSIUM SERPL-SCNC: 4.7 MMOL/L (ref 3.5–5.3)
PROT SERPL-MCNC: 6.8 G/DL (ref 6.1–8.1)
SODIUM SERPL-SCNC: 141 MMOL/L (ref 135–146)
TRIGL SERPL-MCNC: 86 MG/DL

## 2025-04-28 ENCOUNTER — HOSPITAL ENCOUNTER (OUTPATIENT)
Dept: RADIOLOGY | Facility: CLINIC | Age: 50
Discharge: HOME | End: 2025-04-28
Payer: COMMERCIAL

## 2025-04-28 DIAGNOSIS — R91.8 OTHER NONSPECIFIC ABNORMAL FINDING OF LUNG FIELD: ICD-10-CM

## 2025-04-28 PROCEDURE — 71250 CT THORAX DX C-: CPT | Performed by: RADIOLOGY

## 2025-04-28 PROCEDURE — 71250 CT THORAX DX C-: CPT

## 2025-04-29 ENCOUNTER — APPOINTMENT (OUTPATIENT)
Dept: PRIMARY CARE | Facility: CLINIC | Age: 50
End: 2025-04-29
Payer: COMMERCIAL

## 2025-04-29 VITALS
HEIGHT: 74 IN | WEIGHT: 226 LBS | DIASTOLIC BLOOD PRESSURE: 80 MMHG | BODY MASS INDEX: 29 KG/M2 | SYSTOLIC BLOOD PRESSURE: 132 MMHG | HEART RATE: 91 BPM

## 2025-04-29 DIAGNOSIS — R91.1 NODULE OF UPPER LOBE OF RIGHT LUNG: ICD-10-CM

## 2025-04-29 DIAGNOSIS — F10.20 ALCOHOL DEPENDENCE, EPISODIC (MULTI): ICD-10-CM

## 2025-04-29 DIAGNOSIS — Z12.5 PROSTATE CANCER SCREENING: ICD-10-CM

## 2025-04-29 DIAGNOSIS — K63.5 DYSPLASTIC POLYP OF COLON: ICD-10-CM

## 2025-04-29 DIAGNOSIS — E78.2 MIXED HYPERLIPIDEMIA: ICD-10-CM

## 2025-04-29 DIAGNOSIS — Z00.00 PREVENTATIVE HEALTH CARE: Primary | ICD-10-CM

## 2025-04-29 DIAGNOSIS — I10 PRIMARY HYPERTENSION: ICD-10-CM

## 2025-04-29 DIAGNOSIS — R73.02 IGT (IMPAIRED GLUCOSE TOLERANCE): ICD-10-CM

## 2025-04-29 PROCEDURE — 3075F SYST BP GE 130 - 139MM HG: CPT | Performed by: INTERNAL MEDICINE

## 2025-04-29 PROCEDURE — 99396 PREV VISIT EST AGE 40-64: CPT | Performed by: INTERNAL MEDICINE

## 2025-04-29 PROCEDURE — 3079F DIAST BP 80-89 MM HG: CPT | Performed by: INTERNAL MEDICINE

## 2025-04-29 PROCEDURE — 3008F BODY MASS INDEX DOCD: CPT | Performed by: INTERNAL MEDICINE

## 2025-04-29 ASSESSMENT — COLUMBIA-SUICIDE SEVERITY RATING SCALE - C-SSRS
2. HAVE YOU ACTUALLY HAD ANY THOUGHTS OF KILLING YOURSELF?: NO
1. IN THE PAST MONTH, HAVE YOU WISHED YOU WERE DEAD OR WISHED YOU COULD GO TO SLEEP AND NOT WAKE UP?: NO
6. HAVE YOU EVER DONE ANYTHING, STARTED TO DO ANYTHING, OR PREPARED TO DO ANYTHING TO END YOUR LIFE?: NO

## 2025-04-29 ASSESSMENT — ENCOUNTER SYMPTOMS
LOSS OF SENSATION IN FEET: 0
OCCASIONAL FEELINGS OF UNSTEADINESS: 0
DEPRESSION: 0

## 2025-04-29 ASSESSMENT — PATIENT HEALTH QUESTIONNAIRE - PHQ9
2. FEELING DOWN, DEPRESSED OR HOPELESS: NOT AT ALL
1. LITTLE INTEREST OR PLEASURE IN DOING THINGS: NOT AT ALL
SUM OF ALL RESPONSES TO PHQ9 QUESTIONS 1 AND 2: 0

## 2025-04-29 NOTE — ASSESSMENT & PLAN NOTE
Surveillance colonoscopy completed March 3, 2028 with 1 tubular adenoma removed from colon okay to return to 3-year surveillance history of former smoking

## 2025-04-29 NOTE — PROGRESS NOTES
"Subjective   Reason for Visit: Nato Lopez is an 50 y.o. male here for a Medicare Wellness visit.     Past Medical, Surgical, and Family History reviewed and updated in chart.    Reviewed all medications by prescribing practitioner or clinical pharmacist (such as prescriptions, OTCs, herbal therapies and supplements) and documented in the medical record.    HPI    Patient Care Team:  Mario Stoll DO as PCP - General  Mario Stoll DO as PCP - [facility] P[facility]     Review of Systems   All other systems reviewed and are negative.      Objective   Vitals:  /80 (BP Location: Left arm)   Pulse 91   Ht 1.88 m (6' 2\")   Wt 103 kg (226 lb)   BMI 29.02 kg/m²       Physical Exam  Vitals and nursing note reviewed.   Constitutional:       General: He is not in acute distress.     Appearance: Normal appearance. He is well-developed. He is not toxic-appearing.   HENT:      Head: Normocephalic and atraumatic.      Right Ear: Tympanic membrane and external ear normal.      Left Ear: Tympanic membrane and external ear normal.      Nose: Nose normal.      Mouth/Throat:      Mouth: Mucous membranes are moist.      Pharynx: Oropharynx is clear. No oropharyngeal exudate or posterior oropharyngeal erythema.      Tonsils: No tonsillar exudate. 2+ on the right. 2+ on the left.   Eyes:      Extraocular Movements: Extraocular movements intact.      Conjunctiva/sclera: Conjunctivae normal.   Cardiovascular:      Rate and Rhythm: Normal rate and regular rhythm.      Pulses: Normal pulses.      Heart sounds: Normal heart sounds. No murmur heard.  Pulmonary:      Effort: Pulmonary effort is normal.      Breath sounds: Normal breath sounds.   Abdominal:      General: Abdomen is flat. Bowel sounds are normal.      Palpations: Abdomen is soft.   Musculoskeletal:      Cervical back: Neck supple.   Feet:      Right foot:      Skin integrity: Skin integrity normal. No ulcer, blister, skin breakdown, erythema, warmth or callus. "      Toenail Condition: Right toenails are normal.      Left foot:      Skin integrity: Skin integrity normal. No ulcer, blister, skin breakdown, erythema, warmth or callus.      Toenail Condition: Left toenails are normal.   Lymphadenopathy:      Cervical: No cervical adenopathy.   Skin:     General: Skin is warm and dry.      Capillary Refill: Capillary refill takes more than 3 seconds.      Findings: No rash.   Neurological:      Mental Status: He is alert. Mental status is at baseline.      Sensory: Sensation is intact.   Psychiatric:         Mood and Affect: Mood normal.         Behavior: Behavior normal.         Thought Content: Thought content normal.         Judgment: Judgment normal.     Lifestyle Recommendations  I recommend a whole-food plant-based diet, an eating pattern that encourages the consumption of unrefined plant foods (such as fruits, vegetables, tubers, whole grains, legumes, nuts and seeds) and discourages meats, dairy products, eggs and processed foods.     The AHA/ACC recommends that the patient consume a dietary pattern that emphasizes intake of vegetables, fruits, and whole grains; includes low-fat dairy products, poultry, fish, legumes, non-tropical vegetable oils, and nuts; and limits intake of sodium, sweets, sugar-sweetened beverages, and red meats.  Adapt this dietary pattern to appropriate calorie requirements (a 500-750 kcal/day deficit to loose weight), personal and cultural food preferences, and nutrition therapy for other medical conditions (including diabetes).  Achieve this pattern by following plans such as the [place] Mediterranean, DASH dietary pattern, or AHA diet.     Engage in 2 hours and 30 minutes per week of moderate-intensity physical activity, or 1 hour and 15 minutes (75 minutes) per week of vigorous-intensity aerobic physical activity, or an equivalent combination of moderate and vigorous-intensity aerobic physical activity. Aerobic activity should be performed in  episodes of at least 10 minutes preferably spread throughout the week.     Adhering to a heart healthy diet, regular exercise habits, avoidance of tobacco products, and maintenance of a healthy weight are crucial components of their heart disease risk reduction.    Patient Health Questionnaire-2 Score: 0 (4/29/2025  7:58 AM).  This indicates a positive screen but may not meet the criteria for a clinical depression diagnosis. Symptoms were reviewed with Nato.  Follow-up within the next 3 months is recommended to re-assess symptoms and monitor mental health status.    Assessment & Plan  IGT (impaired glucose tolerance)  Improvement with intermittent fasting and reduction in alcohol intake with hemoglobin A1c improved to 5.7 fasting blood sugar 108 reevaluate in 6 months  Orders:    Follow Up In [facility] - PCP - Health Maintenance    Comprehensive Metabolic Panel; Future    Hemoglobin A1C; Future    Lipid Panel; Future    Albumin-Creatinine Ratio, Urine Random; Future    Follow Up In [facility] - PCP - Established; Future    Primary hypertension  Improved blood pressure control on hydrochlorothiazide 12.5 mg daily continue to work on alcohol cessation reevaluate with next visit  Orders:    Comprehensive Metabolic Panel; Future    Hemoglobin A1C; Future    Lipid Panel; Future    Albumin-Creatinine Ratio, Urine Random; Future    Alcohol dependence, episodic (Multi)  Patient down to 40 to 80 ounces of alcohol in the form of beer daily.  Encourage patient to continue to abstain from regular alcohol use as it will improve his ability to lose weight manage blood pressure and impaired fasting sugar monitor liver functions closely  Orders:    Comprehensive Metabolic Panel; Future    Hemoglobin A1C; Future    Lipid Panel; Future    Albumin-Creatinine Ratio, Urine Random; Future    Dysplastic polyp of colon  Surveillance colonoscopy completed March 3, 2028 with 1 tubular adenoma removed from colon okay  to return to 3-year surveillance history of former smoking       Nodule of upper lobe of right lung  Follow-up plan lung cancer screening completed yesterday stable asymptomatic patient using sublingual nicotine to control smoking habit       Mixed hyperlipidemia  Significant improvement in LDL cholesterol profile and risk with triglycerides improved to 86 HDL 78 LDL 88 continue rosuvastatin 10 mg daily            Prostate cancer screening  Asymptomatic at this time reevaluate PSA in 6 months  Orders:    PSA; Future    Preventative health care  Up-to-date with current vaccinations consider hepatitis B series and pneumococcal vaccine reevaluate with next visit              Alcohol Misuse Screen  5 - 10 minutes were spent screening the patient for alcohol misuse disorder.    Alcohol Use Counseling  15 - 20 minutes were spent counseling the patient and offering support/resources on alcohol use disorder.

## 2025-04-29 NOTE — ASSESSMENT & PLAN NOTE
Improved blood pressure control on hydrochlorothiazide 12.5 mg daily continue to work on alcohol cessation reevaluate with next visit  Orders:    Comprehensive Metabolic Panel; Future    Hemoglobin A1C; Future    Lipid Panel; Future    Albumin-Creatinine Ratio, Urine Random; Future

## 2025-04-29 NOTE — ASSESSMENT & PLAN NOTE
Follow-up plan lung cancer screening completed yesterday stable asymptomatic patient using sublingual nicotine to control smoking habit

## 2025-04-29 NOTE — ASSESSMENT & PLAN NOTE
Significant improvement in LDL cholesterol profile and risk with triglycerides improved to 86 HDL 78 LDL 88 continue rosuvastatin 10 mg daily

## 2025-04-29 NOTE — PROGRESS NOTES
Subjective   Patient ID: Nato Lopez is a 50 y.o. male who presents for Annual Exam (Wellness exam/Review labs ).    HPI     Review of Systems    Objective   There were no vitals taken for this visit.    Physical Exam    Assessment/Plan

## 2025-04-29 NOTE — ASSESSMENT & PLAN NOTE
Patient down to 40 to 80 ounces of alcohol in the form of beer daily.  Encourage patient to continue to abstain from regular alcohol use as it will improve his ability to lose weight manage blood pressure and impaired fasting sugar monitor liver functions closely  Orders:    Comprehensive Metabolic Panel; Future    Hemoglobin A1C; Future    Lipid Panel; Future    Albumin-Creatinine Ratio, Urine Random; Future

## 2025-08-11 ENCOUNTER — APPOINTMENT (OUTPATIENT)
Dept: RADIOLOGY | Facility: HOSPITAL | Age: 50
End: 2025-08-11
Payer: COMMERCIAL

## 2025-11-05 ENCOUNTER — APPOINTMENT (OUTPATIENT)
Dept: PRIMARY CARE | Facility: CLINIC | Age: 50
End: 2025-11-05
Payer: COMMERCIAL